# Patient Record
Sex: FEMALE | Race: WHITE | Employment: OTHER | URBAN - METROPOLITAN AREA
[De-identification: names, ages, dates, MRNs, and addresses within clinical notes are randomized per-mention and may not be internally consistent; named-entity substitution may affect disease eponyms.]

---

## 2023-04-21 ENCOUNTER — APPOINTMENT (OUTPATIENT)
Dept: ULTRASOUND IMAGING | Age: 63
End: 2023-04-21
Payer: COMMERCIAL

## 2023-04-21 ENCOUNTER — APPOINTMENT (OUTPATIENT)
Dept: MRI IMAGING | Age: 63
End: 2023-04-21
Payer: COMMERCIAL

## 2023-04-21 ENCOUNTER — APPOINTMENT (OUTPATIENT)
Dept: CT IMAGING | Age: 63
End: 2023-04-21
Payer: COMMERCIAL

## 2023-04-21 ENCOUNTER — HOSPITAL ENCOUNTER (OUTPATIENT)
Age: 63
Setting detail: OBSERVATION
Discharge: NURSING, REHAB OR LONG-TERM CARE | End: 2023-04-27
Attending: EMERGENCY MEDICINE | Admitting: FAMILY MEDICINE
Payer: COMMERCIAL

## 2023-04-21 ENCOUNTER — APPOINTMENT (OUTPATIENT)
Dept: NON INVASIVE DIAGNOSTICS | Age: 63
End: 2023-04-21
Payer: COMMERCIAL

## 2023-04-21 DIAGNOSIS — I63.9 CEREBROVASCULAR ACCIDENT (CVA), UNSPECIFIED MECHANISM (HCC): ICD-10-CM

## 2023-04-21 DIAGNOSIS — Z75.8 DOES NOT HAVE PRIMARY CARE PROVIDER: Primary | ICD-10-CM

## 2023-04-21 DIAGNOSIS — R42 DIZZINESS: ICD-10-CM

## 2023-04-21 PROBLEM — E66.812 CLASS 2 SEVERE OBESITY DUE TO EXCESS CALORIES WITH SERIOUS COMORBIDITY AND BODY MASS INDEX (BMI) OF 36.0 TO 36.9 IN ADULT: Status: ACTIVE | Noted: 2023-04-21

## 2023-04-21 PROBLEM — E04.1 THYROID NODULE: Status: ACTIVE | Noted: 2023-04-21

## 2023-04-21 PROBLEM — Z86.73 HISTORY OF CEREBROVASCULAR ACCIDENT: Status: ACTIVE | Noted: 2023-04-21

## 2023-04-21 PROBLEM — R29.90 STROKE-LIKE SYMPTOMS: Status: ACTIVE | Noted: 2023-04-21

## 2023-04-21 PROBLEM — R91.1 PULMONARY NODULE: Status: ACTIVE | Noted: 2023-04-21

## 2023-04-21 PROBLEM — E66.01 CLASS 2 SEVERE OBESITY DUE TO EXCESS CALORIES WITH SERIOUS COMORBIDITY AND BODY MASS INDEX (BMI) OF 36.0 TO 36.9 IN ADULT (HCC): Status: ACTIVE | Noted: 2023-04-21

## 2023-04-21 PROBLEM — F51.04 PSYCHOPHYSIOLOGIC INSOMNIA: Status: ACTIVE | Noted: 2023-04-21

## 2023-04-21 LAB
ANION GAP SERPL CALC-SCNC: 3 MMOL/L (ref 2–11)
BASOPHILS # BLD: 0 K/UL (ref 0–0.2)
BASOPHILS NFR BLD: 1 % (ref 0–2)
BUN SERPL-MCNC: 16 MG/DL (ref 8–23)
CALCIUM SERPL-MCNC: 8.9 MG/DL (ref 8.3–10.4)
CHLORIDE SERPL-SCNC: 104 MMOL/L (ref 101–110)
CHP ED QC CHECK: YES
CO2 SERPL-SCNC: 27 MMOL/L (ref 21–32)
CREAT SERPL-MCNC: 0.71 MG/DL (ref 0.6–1)
DIFFERENTIAL METHOD BLD: NORMAL
ECHO AO ASC DIAM: 3 CM
ECHO AO ASCENDING AORTA INDEX: 1.32 CM/M2
ECHO AO ROOT DIAM: 3.2 CM
ECHO AO ROOT INDEX: 1.41 CM/M2
ECHO AV AREA PEAK VELOCITY: 3 CM2
ECHO AV AREA VTI: 2.9 CM2
ECHO AV AREA/BSA PEAK VELOCITY: 1.3 CM2/M2
ECHO AV AREA/BSA VTI: 1.3 CM2/M2
ECHO AV MEAN GRADIENT: 7 MMHG
ECHO AV MEAN VELOCITY: 1.3 M/S
ECHO AV PEAK GRADIENT: 13 MMHG
ECHO AV PEAK VELOCITY: 1.8 M/S
ECHO AV VELOCITY RATIO: 0.89
ECHO AV VTI: 34.6 CM
ECHO BSA: 2.35 M2
ECHO EST RA PRESSURE: 3 MMHG
ECHO IVC EXP: 1.7 CM
ECHO LA AREA 2C: 18.1 CM2
ECHO LA AREA 4C: 24.2 CM2
ECHO LA DIAMETER INDEX: 1.59 CM/M2
ECHO LA DIAMETER: 3.6 CM
ECHO LA MAJOR AXIS: 6 CM
ECHO LA MINOR AXIS: 5.7 CM
ECHO LA TO AORTIC ROOT RATIO: 1.13
ECHO LA VOL 2C: 47 ML (ref 22–52)
ECHO LA VOL 4C: 79 ML (ref 22–52)
ECHO LA VOL BP: 62 ML (ref 22–52)
ECHO LA VOL/BSA BIPLANE: 27 ML/M2 (ref 16–34)
ECHO LA VOLUME INDEX A2C: 21 ML/M2 (ref 16–34)
ECHO LA VOLUME INDEX A4C: 35 ML/M2 (ref 16–34)
ECHO LV E' LATERAL VELOCITY: 12 CM/S
ECHO LV E' SEPTAL VELOCITY: 9 CM/S
ECHO LV EDV A2C: 95 ML
ECHO LV EDV A4C: 72 ML
ECHO LV EDV INDEX A4C: 32 ML/M2
ECHO LV EDV NDEX A2C: 42 ML/M2
ECHO LV EJECTION FRACTION A2C: 63 %
ECHO LV EJECTION FRACTION A4C: 57 %
ECHO LV EJECTION FRACTION BIPLANE: 62 % (ref 55–100)
ECHO LV ESV A2C: 35 ML
ECHO LV ESV A4C: 31 ML
ECHO LV ESV INDEX A2C: 15 ML/M2
ECHO LV ESV INDEX A4C: 14 ML/M2
ECHO LV FRACTIONAL SHORTENING: 34 % (ref 28–44)
ECHO LV INTERNAL DIMENSION DIASTOLE INDEX: 1.94 CM/M2
ECHO LV INTERNAL DIMENSION DIASTOLIC: 4.4 CM (ref 3.9–5.3)
ECHO LV INTERNAL DIMENSION SYSTOLIC INDEX: 1.28 CM/M2
ECHO LV INTERNAL DIMENSION SYSTOLIC: 2.9 CM
ECHO LV IVSD: 1.2 CM (ref 0.6–0.9)
ECHO LV MASS 2D: 147.8 G (ref 67–162)
ECHO LV MASS INDEX 2D: 65.1 G/M2 (ref 43–95)
ECHO LV POSTERIOR WALL DIASTOLIC: 0.8 CM (ref 0.6–0.9)
ECHO LV RELATIVE WALL THICKNESS RATIO: 0.36
ECHO LVOT AREA: 3.5 CM2
ECHO LVOT AV VTI INDEX: 0.85
ECHO LVOT DIAM: 2.1 CM
ECHO LVOT MEAN GRADIENT: 5 MMHG
ECHO LVOT PEAK GRADIENT: 10 MMHG
ECHO LVOT PEAK VELOCITY: 1.6 M/S
ECHO LVOT STROKE VOLUME INDEX: 44.7 ML/M2
ECHO LVOT SV: 101.4 ML
ECHO LVOT VTI: 29.3 CM
ECHO MV A VELOCITY: 1.06 M/S
ECHO MV E DECELERATION TIME (DT): 211 MS
ECHO MV E VELOCITY: 1.03 M/S
ECHO MV E/A RATIO: 0.97
ECHO MV E/E' LATERAL: 8.58
ECHO MV E/E' RATIO (AVERAGED): 10.01
ECHO MV E/E' SEPTAL: 11.44
ECHO PV ACCELERATION TIME (AT): 121 MS
ECHO PV MAX VELOCITY: 1.1 M/S
ECHO PV PEAK GRADIENT: 5 MMHG
ECHO RV BASAL DIMENSION: 3.7 CM
ECHO RV FREE WALL PEAK S': 12 CM/S
ECHO RV TAPSE: 1.9 CM (ref 1.7–?)
EKG ATRIAL RATE: 90 BPM
EKG DIAGNOSIS: NORMAL
EKG P AXIS: 0 DEGREES
EKG P-R INTERVAL: 122 MS
EKG Q-T INTERVAL: 361 MS
EKG QRS DURATION: 85 MS
EKG QTC CALCULATION (BAZETT): 440 MS
EKG R AXIS: 87 DEGREES
EKG T AXIS: 69 DEGREES
EKG VENTRICULAR RATE: 89 BPM
EOSINOPHIL # BLD: 0.1 K/UL (ref 0–0.8)
EOSINOPHIL NFR BLD: 2 % (ref 0.5–7.8)
ERYTHROCYTE [DISTWIDTH] IN BLOOD BY AUTOMATED COUNT: 12.7 % (ref 11.9–14.6)
GLUCOSE BLD STRIP.AUTO-MCNC: 96 MG/DL (ref 65–100)
GLUCOSE SERPL-MCNC: 95 MG/DL (ref 65–100)
HCT VFR BLD AUTO: 42.6 % (ref 35.8–46.3)
HGB BLD-MCNC: 13.4 G/DL (ref 11.7–15.4)
IMM GRANULOCYTES # BLD AUTO: 0 K/UL (ref 0–0.5)
IMM GRANULOCYTES NFR BLD AUTO: 0 % (ref 0–5)
INR BLD: 1 (ref 0.9–1.2)
LYMPHOCYTES # BLD: 1.1 K/UL (ref 0.5–4.6)
LYMPHOCYTES NFR BLD: 14 % (ref 13–44)
MCH RBC QN AUTO: 28.6 PG (ref 26.1–32.9)
MCHC RBC AUTO-ENTMCNC: 31.5 G/DL (ref 31.4–35)
MCV RBC AUTO: 90.8 FL (ref 82–102)
MONOCYTES # BLD: 0.5 K/UL (ref 0.1–1.3)
MONOCYTES NFR BLD: 7 % (ref 4–12)
NEUTS SEG # BLD: 5.6 K/UL (ref 1.7–8.2)
NEUTS SEG NFR BLD: 77 % (ref 43–78)
NRBC # BLD: 0 K/UL (ref 0–0.2)
PLATELET # BLD AUTO: 203 K/UL (ref 150–450)
PMV BLD AUTO: 11.8 FL (ref 9.4–12.3)
POTASSIUM SERPL-SCNC: 4 MMOL/L (ref 3.5–5.1)
PT BLD: 12.3 SECS (ref 9.6–11.6)
RBC # BLD AUTO: 4.69 M/UL (ref 4.05–5.2)
SERVICE CMNT-IMP: NORMAL
SODIUM SERPL-SCNC: 134 MMOL/L (ref 133–143)
TROPONIN I SERPL HS-MCNC: 4.4 PG/ML (ref 0–14)
WBC # BLD AUTO: 7.4 K/UL (ref 4.3–11.1)

## 2023-04-21 PROCEDURE — 84484 ASSAY OF TROPONIN QUANT: CPT

## 2023-04-21 PROCEDURE — 93306 TTE W/DOPPLER COMPLETE: CPT

## 2023-04-21 PROCEDURE — 71250 CT THORAX DX C-: CPT

## 2023-04-21 PROCEDURE — G0378 HOSPITAL OBSERVATION PER HR: HCPCS

## 2023-04-21 PROCEDURE — 6370000000 HC RX 637 (ALT 250 FOR IP): Performed by: FAMILY MEDICINE

## 2023-04-21 PROCEDURE — 99285 EMERGENCY DEPT VISIT HI MDM: CPT | Performed by: EMERGENCY MEDICINE

## 2023-04-21 PROCEDURE — 2500000003 HC RX 250 WO HCPCS: Performed by: FAMILY MEDICINE

## 2023-04-21 PROCEDURE — 82962 GLUCOSE BLOOD TEST: CPT

## 2023-04-21 PROCEDURE — 93306 TTE W/DOPPLER COMPLETE: CPT | Performed by: INTERNAL MEDICINE

## 2023-04-21 PROCEDURE — 93005 ELECTROCARDIOGRAM TRACING: CPT | Performed by: EMERGENCY MEDICINE

## 2023-04-21 PROCEDURE — 70498 CT ANGIOGRAPHY NECK: CPT

## 2023-04-21 PROCEDURE — 2580000003 HC RX 258: Performed by: EMERGENCY MEDICINE

## 2023-04-21 PROCEDURE — 6360000002 HC RX W HCPCS: Performed by: FAMILY MEDICINE

## 2023-04-21 PROCEDURE — 80048 BASIC METABOLIC PNL TOTAL CA: CPT

## 2023-04-21 PROCEDURE — 76536 US EXAM OF HEAD AND NECK: CPT

## 2023-04-21 PROCEDURE — 70450 CT HEAD/BRAIN W/O DYE: CPT

## 2023-04-21 PROCEDURE — 2580000003 HC RX 258: Performed by: FAMILY MEDICINE

## 2023-04-21 PROCEDURE — 96372 THER/PROPH/DIAG INJ SC/IM: CPT

## 2023-04-21 PROCEDURE — 85025 COMPLETE CBC W/AUTO DIFF WBC: CPT

## 2023-04-21 PROCEDURE — 93970 EXTREMITY STUDY: CPT

## 2023-04-21 PROCEDURE — 85610 PROTHROMBIN TIME: CPT

## 2023-04-21 PROCEDURE — 6360000004 HC RX CONTRAST MEDICATION: Performed by: EMERGENCY MEDICINE

## 2023-04-21 RX ORDER — ONDANSETRON 2 MG/ML
4 INJECTION INTRAMUSCULAR; INTRAVENOUS EVERY 6 HOURS PRN
Status: DISCONTINUED | OUTPATIENT
Start: 2023-04-21 | End: 2023-04-27 | Stop reason: HOSPADM

## 2023-04-21 RX ORDER — SODIUM CHLORIDE 0.9 % (FLUSH) 0.9 %
5-40 SYRINGE (ML) INJECTION 2 TIMES DAILY
Status: DISCONTINUED | OUTPATIENT
Start: 2023-04-21 | End: 2023-04-27 | Stop reason: HOSPADM

## 2023-04-21 RX ORDER — TRAZODONE HYDROCHLORIDE 150 MG/1
150 TABLET ORAL NIGHTLY
COMMUNITY

## 2023-04-21 RX ORDER — ROSUVASTATIN CALCIUM 20 MG/1
40 TABLET, COATED ORAL NIGHTLY
Status: DISCONTINUED | OUTPATIENT
Start: 2023-04-21 | End: 2023-04-27 | Stop reason: HOSPADM

## 2023-04-21 RX ORDER — 0.9 % SODIUM CHLORIDE 0.9 %
100 INTRAVENOUS SOLUTION INTRAVENOUS ONCE
Status: COMPLETED | OUTPATIENT
Start: 2023-04-21 | End: 2023-04-21

## 2023-04-21 RX ORDER — POTASSIUM CHLORIDE 7.45 MG/ML
10 INJECTION INTRAVENOUS PRN
Status: DISCONTINUED | OUTPATIENT
Start: 2023-04-21 | End: 2023-04-27 | Stop reason: HOSPADM

## 2023-04-21 RX ORDER — TRAZODONE HYDROCHLORIDE 50 MG/1
150 TABLET ORAL NIGHTLY
Status: DISCONTINUED | OUTPATIENT
Start: 2023-04-21 | End: 2023-04-27 | Stop reason: HOSPADM

## 2023-04-21 RX ORDER — ACETAMINOPHEN 325 MG/1
650 TABLET ORAL EVERY 4 HOURS PRN
Status: DISCONTINUED | OUTPATIENT
Start: 2023-04-21 | End: 2023-04-27 | Stop reason: HOSPADM

## 2023-04-21 RX ORDER — ASPIRIN 300 MG/1
300 SUPPOSITORY RECTAL DAILY
Status: DISCONTINUED | OUTPATIENT
Start: 2023-04-21 | End: 2023-04-22

## 2023-04-21 RX ORDER — LABETALOL HYDROCHLORIDE 5 MG/ML
10 INJECTION, SOLUTION INTRAVENOUS EVERY 10 MIN PRN
Status: DISCONTINUED | OUTPATIENT
Start: 2023-04-21 | End: 2023-04-27 | Stop reason: HOSPADM

## 2023-04-21 RX ORDER — POLYETHYLENE GLYCOL 3350 17 G/17G
17 POWDER, FOR SOLUTION ORAL DAILY PRN
Status: DISCONTINUED | OUTPATIENT
Start: 2023-04-21 | End: 2023-04-27 | Stop reason: HOSPADM

## 2023-04-21 RX ORDER — ONDANSETRON 4 MG/1
4 TABLET, ORALLY DISINTEGRATING ORAL EVERY 8 HOURS PRN
Status: DISCONTINUED | OUTPATIENT
Start: 2023-04-21 | End: 2023-04-27 | Stop reason: HOSPADM

## 2023-04-21 RX ORDER — ENOXAPARIN SODIUM 100 MG/ML
30 INJECTION SUBCUTANEOUS EVERY 12 HOURS
Status: DISCONTINUED | OUTPATIENT
Start: 2023-04-21 | End: 2023-04-27 | Stop reason: HOSPADM

## 2023-04-21 RX ORDER — ASPIRIN 81 MG/1
81 TABLET ORAL DAILY
Status: DISCONTINUED | OUTPATIENT
Start: 2023-04-21 | End: 2023-04-27 | Stop reason: HOSPADM

## 2023-04-21 RX ORDER — MAGNESIUM SULFATE IN WATER 40 MG/ML
2000 INJECTION, SOLUTION INTRAVENOUS PRN
Status: DISCONTINUED | OUTPATIENT
Start: 2023-04-21 | End: 2023-04-27 | Stop reason: HOSPADM

## 2023-04-21 RX ORDER — POTASSIUM CHLORIDE 20 MEQ/1
40 TABLET, EXTENDED RELEASE ORAL PRN
Status: DISCONTINUED | OUTPATIENT
Start: 2023-04-21 | End: 2023-04-27 | Stop reason: HOSPADM

## 2023-04-21 RX ADMIN — ACETAMINOPHEN 650 MG: 325 TABLET, FILM COATED ORAL at 20:21

## 2023-04-21 RX ADMIN — TUBERCULIN PURIFIED PROTEIN DERIVATIVE 5 UNITS: 5 INJECTION, SOLUTION INTRADERMAL at 17:00

## 2023-04-21 RX ADMIN — SODIUM CHLORIDE 100 ML: 9 INJECTION, SOLUTION INTRAVENOUS at 10:50

## 2023-04-21 RX ADMIN — SODIUM CHLORIDE, PRESERVATIVE FREE 10 ML: 5 INJECTION INTRAVENOUS at 10:50

## 2023-04-21 RX ADMIN — SODIUM CHLORIDE, PRESERVATIVE FREE 10 ML: 5 INJECTION INTRAVENOUS at 20:11

## 2023-04-21 RX ADMIN — ROSUVASTATIN 40 MG: 20 TABLET, FILM COATED ORAL at 20:10

## 2023-04-21 RX ADMIN — TRAZODONE HYDROCHLORIDE 150 MG: 50 TABLET ORAL at 20:10

## 2023-04-21 RX ADMIN — ENOXAPARIN SODIUM 30 MG: 100 INJECTION SUBCUTANEOUS at 16:54

## 2023-04-21 RX ADMIN — IOPAMIDOL 60 ML: 755 INJECTION, SOLUTION INTRAVENOUS at 10:50

## 2023-04-21 ASSESSMENT — ENCOUNTER SYMPTOMS
NAUSEA: 0
SHORTNESS OF BREATH: 0
WHEEZING: 0
EYE PAIN: 0
SINUS PAIN: 0
ABDOMINAL PAIN: 0
BACK PAIN: 0
VOMITING: 0
TROUBLE SWALLOWING: 0
EYE REDNESS: 0
BLOOD IN STOOL: 0
CONSTIPATION: 0
DIARRHEA: 0
COUGH: 0
EYE ITCHING: 0

## 2023-04-21 ASSESSMENT — PAIN DESCRIPTION - LOCATION
LOCATION: HEAD
LOCATION: HEAD

## 2023-04-21 ASSESSMENT — PAIN SCALES - GENERAL
PAINLEVEL_OUTOF10: 6
PAINLEVEL_OUTOF10: 6

## 2023-04-21 ASSESSMENT — PAIN - FUNCTIONAL ASSESSMENT: PAIN_FUNCTIONAL_ASSESSMENT: NONE - DENIES PAIN

## 2023-04-22 ENCOUNTER — APPOINTMENT (OUTPATIENT)
Dept: MRI IMAGING | Age: 63
End: 2023-04-22
Payer: COMMERCIAL

## 2023-04-22 PROBLEM — R53.81 DEBILITY: Status: ACTIVE | Noted: 2021-06-13

## 2023-04-22 PROBLEM — F17.210 CIGARETTE NICOTINE DEPENDENCE WITHOUT COMPLICATION: Status: RESOLVED | Noted: 2022-03-04 | Resolved: 2023-04-22

## 2023-04-22 PROBLEM — F90.8 ADULT RESIDUAL TYPE ATTENTION DEFICIT HYPERACTIVITY DISORDER (ADHD): Status: ACTIVE | Noted: 2020-11-13

## 2023-04-22 PROBLEM — F51.01 PRIMARY INSOMNIA: Status: ACTIVE | Noted: 2020-09-15

## 2023-04-22 PROBLEM — R41.0 CONFUSION AND DISORIENTATION: Status: RESOLVED | Noted: 2017-06-01 | Resolved: 2023-04-22

## 2023-04-22 PROBLEM — G62.9 NEUROPATHY: Status: ACTIVE | Noted: 2020-09-15

## 2023-04-22 PROBLEM — R41.0 CONFUSION AND DISORIENTATION: Status: ACTIVE | Noted: 2017-06-01

## 2023-04-22 PROBLEM — E78.00 ELEVATED LDL CHOLESTEROL LEVEL: Status: ACTIVE | Noted: 2022-03-04

## 2023-04-22 PROBLEM — R91.8 PULMONARY NODULES: Status: ACTIVE | Noted: 2023-04-21

## 2023-04-22 PROBLEM — F17.200 TOBACCO USE DISORDER: Status: ACTIVE | Noted: 2020-09-15

## 2023-04-22 PROBLEM — H54.42A4 BLINDNESS LEFT EYE CATEGORY 4, NORMAL VISION RIGHT EYE: Status: ACTIVE | Noted: 2022-03-04

## 2023-04-22 PROBLEM — I34.0 MITRAL REGURGITATION: Status: ACTIVE | Noted: 2023-04-22

## 2023-04-22 PROBLEM — J18.9 RIGHT LOWER LOBE PNEUMONIA: Status: ACTIVE | Noted: 2021-11-28

## 2023-04-22 PROBLEM — F17.210 CIGARETTE NICOTINE DEPENDENCE WITHOUT COMPLICATION: Status: ACTIVE | Noted: 2022-03-04

## 2023-04-22 PROBLEM — F31.11 BIPOLAR 1 DISORDER, MANIC, MILD (HCC): Status: ACTIVE | Noted: 2021-06-15

## 2023-04-22 LAB
ALBUMIN SERPL-MCNC: 3.3 G/DL (ref 3.2–4.6)
ANION GAP SERPL CALC-SCNC: 3 MMOL/L (ref 2–11)
BUN SERPL-MCNC: 11 MG/DL (ref 8–23)
CALCIUM SERPL-MCNC: 8.8 MG/DL (ref 8.3–10.4)
CHLORIDE SERPL-SCNC: 107 MMOL/L (ref 101–110)
CHOLEST SERPL-MCNC: 244 MG/DL
CO2 SERPL-SCNC: 25 MMOL/L (ref 21–32)
CREAT SERPL-MCNC: 0.83 MG/DL (ref 0.6–1)
ERYTHROCYTE [DISTWIDTH] IN BLOOD BY AUTOMATED COUNT: 12.9 % (ref 11.9–14.6)
EST. AVERAGE GLUCOSE BLD GHB EST-MCNC: 111 MG/DL
GLUCOSE SERPL-MCNC: 93 MG/DL (ref 65–100)
HBA1C MFR BLD: 5.5 % (ref 4.8–5.6)
HCT VFR BLD AUTO: 41.3 % (ref 35.8–46.3)
HDLC SERPL-MCNC: 64 MG/DL (ref 40–60)
HDLC SERPL: 3.8
HGB BLD-MCNC: 12.9 G/DL (ref 11.7–15.4)
LDLC SERPL CALC-MCNC: 158.6 MG/DL
MCH RBC QN AUTO: 28.4 PG (ref 26.1–32.9)
MCHC RBC AUTO-ENTMCNC: 31.2 G/DL (ref 31.4–35)
MCV RBC AUTO: 91 FL (ref 82–102)
MM INDURATION, POC: 0 MM (ref 0–5)
NRBC # BLD: 0 K/UL (ref 0–0.2)
PHOSPHATE SERPL-MCNC: 3.6 MG/DL (ref 2.3–3.7)
PLATELET # BLD AUTO: 196 K/UL (ref 150–450)
PMV BLD AUTO: 12 FL (ref 9.4–12.3)
POTASSIUM SERPL-SCNC: 3.9 MMOL/L (ref 3.5–5.1)
PPD, POC: NEGATIVE
PROCALCITONIN SERPL-MCNC: <0.05 NG/ML (ref 0–0.49)
RBC # BLD AUTO: 4.54 M/UL (ref 4.05–5.2)
SODIUM SERPL-SCNC: 135 MMOL/L (ref 133–143)
TRIGL SERPL-MCNC: 107 MG/DL (ref 35–150)
TSH W FREE THYROID IF ABNORMAL: 0.62 UIU/ML (ref 0.36–3.74)
VLDLC SERPL CALC-MCNC: 21.4 MG/DL (ref 6–23)
WBC # BLD AUTO: 5.4 K/UL (ref 4.3–11.1)

## 2023-04-22 PROCEDURE — 96374 THER/PROPH/DIAG INJ IV PUSH: CPT

## 2023-04-22 PROCEDURE — 97530 THERAPEUTIC ACTIVITIES: CPT

## 2023-04-22 PROCEDURE — 97161 PT EVAL LOW COMPLEX 20 MIN: CPT

## 2023-04-22 PROCEDURE — G0378 HOSPITAL OBSERVATION PER HR: HCPCS

## 2023-04-22 PROCEDURE — 84100 ASSAY OF PHOSPHORUS: CPT

## 2023-04-22 PROCEDURE — 6360000002 HC RX W HCPCS: Performed by: FAMILY MEDICINE

## 2023-04-22 PROCEDURE — 80048 BASIC METABOLIC PNL TOTAL CA: CPT

## 2023-04-22 PROCEDURE — 80061 LIPID PANEL: CPT

## 2023-04-22 PROCEDURE — 85027 COMPLETE CBC AUTOMATED: CPT

## 2023-04-22 PROCEDURE — 6370000000 HC RX 637 (ALT 250 FOR IP): Performed by: FAMILY MEDICINE

## 2023-04-22 PROCEDURE — 97165 OT EVAL LOW COMPLEX 30 MIN: CPT

## 2023-04-22 PROCEDURE — 2580000003 HC RX 258: Performed by: FAMILY MEDICINE

## 2023-04-22 PROCEDURE — 82040 ASSAY OF SERUM ALBUMIN: CPT

## 2023-04-22 PROCEDURE — 96375 TX/PRO/DX INJ NEW DRUG ADDON: CPT

## 2023-04-22 PROCEDURE — 96372 THER/PROPH/DIAG INJ SC/IM: CPT

## 2023-04-22 PROCEDURE — 36415 COLL VENOUS BLD VENIPUNCTURE: CPT

## 2023-04-22 PROCEDURE — 97535 SELF CARE MNGMENT TRAINING: CPT

## 2023-04-22 PROCEDURE — 83036 HEMOGLOBIN GLYCOSYLATED A1C: CPT

## 2023-04-22 PROCEDURE — 84443 ASSAY THYROID STIM HORMONE: CPT

## 2023-04-22 PROCEDURE — 84145 PROCALCITONIN (PCT): CPT

## 2023-04-22 PROCEDURE — 70551 MRI BRAIN STEM W/O DYE: CPT

## 2023-04-22 RX ORDER — PROCHLORPERAZINE EDISYLATE 5 MG/ML
10 INJECTION INTRAMUSCULAR; INTRAVENOUS ONCE
Status: COMPLETED | OUTPATIENT
Start: 2023-04-22 | End: 2023-04-22

## 2023-04-22 RX ADMIN — ACETAMINOPHEN 650 MG: 325 TABLET, FILM COATED ORAL at 08:43

## 2023-04-22 RX ADMIN — ONDANSETRON 4 MG: 4 TABLET, ORALLY DISINTEGRATING ORAL at 17:56

## 2023-04-22 RX ADMIN — TRAZODONE HYDROCHLORIDE 150 MG: 50 TABLET ORAL at 20:56

## 2023-04-22 RX ADMIN — ENOXAPARIN SODIUM 30 MG: 100 INJECTION SUBCUTANEOUS at 08:35

## 2023-04-22 RX ADMIN — ENOXAPARIN SODIUM 30 MG: 100 INJECTION SUBCUTANEOUS at 20:56

## 2023-04-22 RX ADMIN — SODIUM CHLORIDE, PRESERVATIVE FREE 10 ML: 5 INJECTION INTRAVENOUS at 20:57

## 2023-04-22 RX ADMIN — ASPIRIN 81 MG: 81 TABLET, COATED ORAL at 08:35

## 2023-04-22 RX ADMIN — PROCHLORPERAZINE EDISYLATE 10 MG: 5 INJECTION INTRAMUSCULAR; INTRAVENOUS at 22:14

## 2023-04-22 RX ADMIN — ONDANSETRON 4 MG: 2 INJECTION INTRAMUSCULAR; INTRAVENOUS at 08:32

## 2023-04-22 RX ADMIN — SODIUM CHLORIDE, PRESERVATIVE FREE 20 ML: 5 INJECTION INTRAVENOUS at 08:33

## 2023-04-22 RX ADMIN — ROSUVASTATIN 40 MG: 20 TABLET, FILM COATED ORAL at 20:56

## 2023-04-22 ASSESSMENT — PAIN SCALES - GENERAL
PAINLEVEL_OUTOF10: 0
PAINLEVEL_OUTOF10: 0
PAINLEVEL_OUTOF10: 6

## 2023-04-22 ASSESSMENT — PAIN DESCRIPTION - LOCATION: LOCATION: HEAD

## 2023-04-22 ASSESSMENT — PAIN DESCRIPTION - DESCRIPTORS: DESCRIPTORS: ACHING

## 2023-04-23 LAB
ALBUMIN SERPL-MCNC: 3.2 G/DL (ref 3.2–4.6)
ANION GAP SERPL CALC-SCNC: 3 MMOL/L (ref 2–11)
BUN SERPL-MCNC: 14 MG/DL (ref 8–23)
CALCIUM SERPL-MCNC: 9 MG/DL (ref 8.3–10.4)
CHLORIDE SERPL-SCNC: 110 MMOL/L (ref 101–110)
CO2 SERPL-SCNC: 25 MMOL/L (ref 21–32)
CREAT SERPL-MCNC: 0.83 MG/DL (ref 0.6–1)
ERYTHROCYTE [DISTWIDTH] IN BLOOD BY AUTOMATED COUNT: 13 % (ref 11.9–14.6)
GLUCOSE SERPL-MCNC: 99 MG/DL (ref 65–100)
HCT VFR BLD AUTO: 42.7 % (ref 35.8–46.3)
HGB BLD-MCNC: 13.2 G/DL (ref 11.7–15.4)
MCH RBC QN AUTO: 28.3 PG (ref 26.1–32.9)
MCHC RBC AUTO-ENTMCNC: 30.9 G/DL (ref 31.4–35)
MCV RBC AUTO: 91.6 FL (ref 82–102)
MM INDURATION, POC: 0 MM (ref 0–5)
NRBC # BLD: 0 K/UL (ref 0–0.2)
PHOSPHATE SERPL-MCNC: 4.1 MG/DL (ref 2.3–3.7)
PLATELET # BLD AUTO: 184 K/UL (ref 150–450)
PMV BLD AUTO: 12 FL (ref 9.4–12.3)
POTASSIUM SERPL-SCNC: 3.9 MMOL/L (ref 3.5–5.1)
PPD, POC: NEGATIVE
PROCALCITONIN SERPL-MCNC: <0.05 NG/ML (ref 0–0.49)
RBC # BLD AUTO: 4.66 M/UL (ref 4.05–5.2)
SODIUM SERPL-SCNC: 138 MMOL/L (ref 133–143)
WBC # BLD AUTO: 5.2 K/UL (ref 4.3–11.1)

## 2023-04-23 PROCEDURE — 6370000000 HC RX 637 (ALT 250 FOR IP): Performed by: FAMILY MEDICINE

## 2023-04-23 PROCEDURE — 36415 COLL VENOUS BLD VENIPUNCTURE: CPT

## 2023-04-23 PROCEDURE — 82040 ASSAY OF SERUM ALBUMIN: CPT

## 2023-04-23 PROCEDURE — 84100 ASSAY OF PHOSPHORUS: CPT

## 2023-04-23 PROCEDURE — 80048 BASIC METABOLIC PNL TOTAL CA: CPT

## 2023-04-23 PROCEDURE — 84145 PROCALCITONIN (PCT): CPT

## 2023-04-23 PROCEDURE — 2580000003 HC RX 258: Performed by: FAMILY MEDICINE

## 2023-04-23 PROCEDURE — 6360000002 HC RX W HCPCS: Performed by: FAMILY MEDICINE

## 2023-04-23 PROCEDURE — G0378 HOSPITAL OBSERVATION PER HR: HCPCS

## 2023-04-23 PROCEDURE — 96372 THER/PROPH/DIAG INJ SC/IM: CPT

## 2023-04-23 PROCEDURE — 85027 COMPLETE CBC AUTOMATED: CPT

## 2023-04-23 PROCEDURE — 96376 TX/PRO/DX INJ SAME DRUG ADON: CPT

## 2023-04-23 RX ADMIN — ENOXAPARIN SODIUM 30 MG: 100 INJECTION SUBCUTANEOUS at 08:39

## 2023-04-23 RX ADMIN — TRAZODONE HYDROCHLORIDE 150 MG: 50 TABLET ORAL at 21:21

## 2023-04-23 RX ADMIN — SODIUM CHLORIDE, PRESERVATIVE FREE 10 ML: 5 INJECTION INTRAVENOUS at 21:23

## 2023-04-23 RX ADMIN — ENOXAPARIN SODIUM 30 MG: 100 INJECTION SUBCUTANEOUS at 21:22

## 2023-04-23 RX ADMIN — SODIUM CHLORIDE, PRESERVATIVE FREE 10 ML: 5 INJECTION INTRAVENOUS at 08:40

## 2023-04-23 RX ADMIN — ASPIRIN 81 MG: 81 TABLET, COATED ORAL at 08:40

## 2023-04-23 RX ADMIN — ONDANSETRON 4 MG: 2 INJECTION INTRAMUSCULAR; INTRAVENOUS at 17:35

## 2023-04-23 RX ADMIN — ROSUVASTATIN 40 MG: 20 TABLET, FILM COATED ORAL at 21:21

## 2023-04-24 LAB
ALBUMIN SERPL-MCNC: 3.2 G/DL (ref 3.2–4.6)
ANION GAP SERPL CALC-SCNC: 3 MMOL/L (ref 2–11)
BUN SERPL-MCNC: 15 MG/DL (ref 8–23)
CALCIUM SERPL-MCNC: 8.8 MG/DL (ref 8.3–10.4)
CHLORIDE SERPL-SCNC: 110 MMOL/L (ref 101–110)
CO2 SERPL-SCNC: 26 MMOL/L (ref 21–32)
CREAT SERPL-MCNC: 0.75 MG/DL (ref 0.6–1)
ERYTHROCYTE [DISTWIDTH] IN BLOOD BY AUTOMATED COUNT: 12.9 % (ref 11.9–14.6)
GLUCOSE SERPL-MCNC: 97 MG/DL (ref 65–100)
HCT VFR BLD AUTO: 42.3 % (ref 35.8–46.3)
HGB BLD-MCNC: 13.1 G/DL (ref 11.7–15.4)
MCH RBC QN AUTO: 28.3 PG (ref 26.1–32.9)
MCHC RBC AUTO-ENTMCNC: 31 G/DL (ref 31.4–35)
MCV RBC AUTO: 91.4 FL (ref 82–102)
NRBC # BLD: 0 K/UL (ref 0–0.2)
PHOSPHATE SERPL-MCNC: 4 MG/DL (ref 2.3–3.7)
PLATELET # BLD AUTO: 189 K/UL (ref 150–450)
PMV BLD AUTO: 12.1 FL (ref 9.4–12.3)
POTASSIUM SERPL-SCNC: 4.1 MMOL/L (ref 3.5–5.1)
PROCALCITONIN SERPL-MCNC: <0.05 NG/ML (ref 0–0.49)
RBC # BLD AUTO: 4.63 M/UL (ref 4.05–5.2)
SODIUM SERPL-SCNC: 139 MMOL/L (ref 133–143)
WBC # BLD AUTO: 5.9 K/UL (ref 4.3–11.1)

## 2023-04-24 PROCEDURE — 97116 GAIT TRAINING THERAPY: CPT

## 2023-04-24 PROCEDURE — 85027 COMPLETE CBC AUTOMATED: CPT

## 2023-04-24 PROCEDURE — 80048 BASIC METABOLIC PNL TOTAL CA: CPT

## 2023-04-24 PROCEDURE — 82040 ASSAY OF SERUM ALBUMIN: CPT

## 2023-04-24 PROCEDURE — G0378 HOSPITAL OBSERVATION PER HR: HCPCS

## 2023-04-24 PROCEDURE — 97530 THERAPEUTIC ACTIVITIES: CPT

## 2023-04-24 PROCEDURE — 6370000000 HC RX 637 (ALT 250 FOR IP): Performed by: FAMILY MEDICINE

## 2023-04-24 PROCEDURE — 96372 THER/PROPH/DIAG INJ SC/IM: CPT

## 2023-04-24 PROCEDURE — 2580000003 HC RX 258: Performed by: FAMILY MEDICINE

## 2023-04-24 PROCEDURE — 84100 ASSAY OF PHOSPHORUS: CPT

## 2023-04-24 PROCEDURE — 96376 TX/PRO/DX INJ SAME DRUG ADON: CPT

## 2023-04-24 PROCEDURE — 6360000002 HC RX W HCPCS: Performed by: FAMILY MEDICINE

## 2023-04-24 PROCEDURE — 97110 THERAPEUTIC EXERCISES: CPT

## 2023-04-24 PROCEDURE — 36415 COLL VENOUS BLD VENIPUNCTURE: CPT

## 2023-04-24 PROCEDURE — 84145 PROCALCITONIN (PCT): CPT

## 2023-04-24 RX ADMIN — ROSUVASTATIN 40 MG: 20 TABLET, FILM COATED ORAL at 21:45

## 2023-04-24 RX ADMIN — SODIUM CHLORIDE, PRESERVATIVE FREE 10 ML: 5 INJECTION INTRAVENOUS at 09:01

## 2023-04-24 RX ADMIN — ONDANSETRON 4 MG: 4 TABLET, ORALLY DISINTEGRATING ORAL at 12:30

## 2023-04-24 RX ADMIN — ASPIRIN 81 MG: 81 TABLET, COATED ORAL at 08:59

## 2023-04-24 RX ADMIN — ONDANSETRON 4 MG: 2 INJECTION INTRAMUSCULAR; INTRAVENOUS at 05:41

## 2023-04-24 RX ADMIN — ENOXAPARIN SODIUM 30 MG: 100 INJECTION SUBCUTANEOUS at 08:59

## 2023-04-24 RX ADMIN — TRAZODONE HYDROCHLORIDE 150 MG: 50 TABLET ORAL at 21:45

## 2023-04-24 RX ADMIN — ENOXAPARIN SODIUM 30 MG: 100 INJECTION SUBCUTANEOUS at 21:44

## 2023-04-24 ASSESSMENT — PAIN SCALES - GENERAL: PAINLEVEL_OUTOF10: 0

## 2023-04-24 NOTE — PROGRESS NOTES
ACUTE PHYSICAL THERAPY GOALS:   (Developed with and agreed upon by patient and/or caregiver. )    LTG:   (1.)Ms. Kelsey Davenport will transfer from bed to chair and chair to bed with INDEPENDENT using the least restrictive device within 5 treatment day(s). (2.)Ms. Kelsey Davenport will ambulate with SUPERVISION for 200 feet with the least restrictive device within 5 treatment day(s). 3.  Ms. Kelsey Davenport will ambulate up/down 22 steps with one rail and supervision within 5 treatment days. ________________________________________________________________________________________________      PHYSICAL THERAPY Daily Note and AM  (Link to Caseload Tracking: PT Visit Days : 2  Acknowledge Orders  Time In/Out  PT Charge Capture  Rehab Caseload Tracker    Caren Sinclair is a 58 y.o. female   PRIMARY DIAGNOSIS: Stroke-like symptoms  Dizziness [R42]  Stroke-like symptoms [R29.90]  Cerebrovascular accident (CVA), unspecified [facility] ([facility] [I63.9]  Does not have primary care provider [Z75.8]       Reason for Referral: Other lack of cordination (R27.8)  Difficulty in walking, Not elsewhere classified (R26.2)  Dizziness and Giddiness (R42)  Observation: Payor: [employer] MEDICARE / Plan: [place] [employer] / Product Type: *No Product type* /     ASSESSMENT:     REHAB RECOMMENDATIONS:   Recommendation to date pending progress:  Setting:  Short-term Rehab    Equipment:    To Be Determined     ASSESSMENT:  Ms. Kelsey Davenport admitted with above diagnoses. Patient has a history of CVA affecting her vision but she is independent at base without a device. CT showed \"blurring of the gray-white differentiation centered about he anterior right frontal lobe with mild mass effect\". MRI, however, showed no acute abnormality. Patient currently demonstrates decreased balance, mobility, and independence due to ongoing dizziness. Patient would benefit from therapy to address these deficits.     4/24:  Pt supine upon arrival.  Pt still with dizziness and nausea limiting mobility,

## 2023-04-24 NOTE — PROGRESS NOTES
Hospitalist Progress Note   Admit Date:  2023 10:13 AM   Name:  Kellie Son   Age:  58 y.o. Sex:  female  :  1960   MRN:  [id number]   Room:  365/01    Presenting/Chief Complaint: Dizziness     Reason(s) for Admission: Dizziness [R42]  Stroke-like symptoms [R29.90]  Cerebrovascular accident (CVA), unspecified mechanism ([facility] [I63.9]  Does not have primary care provider [facility] Course:   58 y.o. female with medical history of BMI 36, prior stroke who presented with dizziness and weakness. She was at substance use rehab and woke up dizzy. She noted left sided weakness and difficulty walking. 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 She does not take any medications except trazodone. She went to the ED and a code stroke was called. CT concerning for subacute stroke. CT HN with no cerebrovascular findings. She was admitted to observation for further evaluation and management. Subjective & 24hr Events (23): Remains dizzy, but improved. Will continue PT and consider DC options in AM. Plan discussed with nurse, therapist and . Will attempt to get PT vestibular specialist to evaluate today. Assessment & Plan:   Stroke-like symptoms, dizziness  Admission CT concerning for L stroke. CTA HN negative for CV disease. MRI negative  -consult neurology  -ASA  -PT, OT, SLP, PPD  2023: stroke ruled out, appears severe vertigo, contacted PT vestibular specialist, will attempt eval and treat     Lung nodule  -CT chest in 1 year     Thyroid nodule  US thyroid confirmed multiple nodule, TSH WNL  -Outpatient follow-up for biopsy of node listed on note from radiology     Psychophysiologic insomnia  -trazodone     BMI 36  Increased risk of all cause mortality, complicating care  - healthy lifestyle at discharge    Mitral regurgitation  Noted on echocardiogram      PT/OT evals and PPD needed/ordered? Yes  Diet:  ADULT DIET;  Regular; 4 carb choices (60 gm/meal)  VTE

## 2023-04-24 NOTE — PROGRESS NOTES
Patient very anxious regarding discharge plan, states CM was very rude, and totally unhelpful. Pt requesting Hospitalist and Case management see her in the morning to discuss discharge needs.

## 2023-04-24 NOTE — PROGRESS NOTES
ACUTE OCCUPATIONAL THERAPY GOALS:   (Developed with and agreed upon by patient and/or caregiver.)  1. Patient will perform grooming with supervision. 2. Patient will perform upper body dressing with supervision. 3. Patient will perform lower body dressing with CGA  4. Patient will perform bathing with CGa  5. Patient will perform toileting and toilet transfer with contact guard assist.  Angelica Tadeo 4/24/23    6 Patient/family to demonstrate knowledge of home safety and DME recommendations. Goals to be achieved in 7 days. OCCUPATIONAL THERAPY: Daily Note AM   OT Visit Days: 2   Time In/Out  OT Charge Capture  Rehab Caseload Tracker  OT Orders    Jhoana Ledesma is a 58 y.o. female   PRIMARY DIAGNOSIS: Stroke-like symptoms  Dizziness [R42]  Stroke-like symptoms [R29.90]  Cerebrovascular accident (CVA), unspecified mechanism ([facility] [I63.9]  Does not have primary care provider [Z75.8]       Observation: Payor: Heather Mariscal / Plan: [employer] / Product Type: *No Product type* /     ASSESSMENT:     REHAB RECOMMENDATIONS: CURRENT LEVEL OF FUNCTION:  (Most Recently Demonstrated)   Recommendation to date pending progress:  Setting:  Inpatient Rehab Facility    Equipment:    To Be Determined Bathing:  Not Tested  Dressing:  Not Tested  Feeding/Grooming:  Not Tested  Toileting:  Not Tested  Functional Mobility:  Stand by Assist-contact guard assist      ASSESSMENT:  From initial evaluation on 4/22/23: Ms. Angela Ashford supine in bed. She reported she had a stroke before and she lost vision in her L eye and then the lower portion of her right eye.she lives at [facility] for residential addictions recovery and has been there for a year. She is on the second floor 22 steps but there is an elevator if you get permission to use it. She has a roommate and the bathroom is next to her room with a walk in shower with grab bars and seat. She was independent prior to this episode. She had a similar episode last year but symptoms resolved.  BP supine

## 2023-04-25 PROCEDURE — 96372 THER/PROPH/DIAG INJ SC/IM: CPT

## 2023-04-25 PROCEDURE — 6360000002 HC RX W HCPCS: Performed by: FAMILY MEDICINE

## 2023-04-25 PROCEDURE — G0378 HOSPITAL OBSERVATION PER HR: HCPCS

## 2023-04-25 PROCEDURE — 96376 TX/PRO/DX INJ SAME DRUG ADON: CPT

## 2023-04-25 PROCEDURE — 6370000000 HC RX 637 (ALT 250 FOR IP): Performed by: FAMILY MEDICINE

## 2023-04-25 PROCEDURE — 2580000003 HC RX 258: Performed by: FAMILY MEDICINE

## 2023-04-25 PROCEDURE — 97110 THERAPEUTIC EXERCISES: CPT

## 2023-04-25 PROCEDURE — 97530 THERAPEUTIC ACTIVITIES: CPT

## 2023-04-25 RX ADMIN — ONDANSETRON 4 MG: 4 TABLET, ORALLY DISINTEGRATING ORAL at 16:27

## 2023-04-25 RX ADMIN — ASPIRIN 81 MG: 81 TABLET, COATED ORAL at 09:09

## 2023-04-25 RX ADMIN — TRAZODONE HYDROCHLORIDE 150 MG: 50 TABLET ORAL at 21:38

## 2023-04-25 RX ADMIN — ENOXAPARIN SODIUM 30 MG: 100 INJECTION SUBCUTANEOUS at 21:38

## 2023-04-25 RX ADMIN — ROSUVASTATIN 40 MG: 20 TABLET, FILM COATED ORAL at 21:38

## 2023-04-25 RX ADMIN — ENOXAPARIN SODIUM 30 MG: 100 INJECTION SUBCUTANEOUS at 09:09

## 2023-04-25 RX ADMIN — ONDANSETRON 4 MG: 2 INJECTION INTRAMUSCULAR; INTRAVENOUS at 22:10

## 2023-04-25 RX ADMIN — SODIUM CHLORIDE, PRESERVATIVE FREE 10 ML: 5 INJECTION INTRAVENOUS at 22:10

## 2023-04-25 NOTE — PROGRESS NOTES
PT was in bed awake. [address] [place] [address] introduced self. PT expressed eagerness to speak to [address] [place] [address]. PT shared with [address] [place] [address] about PT's illness, hospitalization, life, and recovery. CH offered empathetic spiritual presence, active listening, and therapeutic communication. 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 PT expressed great gratitude to \"[employer]\", [place], and the [facility]. PT expressed appreciation for Recovery Program at [facility] especially the example of the Staff and the Bible Study. PT stated PT will graduate in May. PT expressed desire to serve God and others PT asked about various types of CH. RN and PT's Friend arrived. CH offered prayer. [address] [place] [address] thanked PT for visit and offered support. Rev. Qamar Bruno M.Div.

## 2023-04-25 NOTE — PROGRESS NOTES
ACUTE PHYSICAL THERAPY GOALS:   (Developed with and agreed upon by patient and/or caregiver. )    LTG:   (1.)Ms. Marilia Oates will transfer from bed to chair and chair to bed with INDEPENDENT using the least restrictive device within 5 treatment day(s). (2.)Ms. Marilia Oates will ambulate with SUPERVISION for 200 feet with the least restrictive device within 5 treatment day(s). 3.  Ms. Marilia Oates will ambulate up/down 22 steps with one rail and supervision within 5 treatment days. ________________________________________________________________________________________________      PHYSICAL THERAPY Daily Note and AM  (Link to Caseload Tracking: PT Visit Days : 3  Acknowledge Orders  Time In/Out  PT Charge Capture  Rehab Caseload Tracker    Rita Rush is a 58 y.o. female   PRIMARY DIAGNOSIS: Stroke-like symptoms  Dizziness [R42]  Stroke-like symptoms [R29.90]  Cerebrovascular accident (CVA), unspecified mechanism [facility] [I63.9]  Does not have primary care provider [Z75.8]       Reason for Referral: Other lack of cordination (R27.8)  Difficulty in walking, Not elsewhere classified (R26.2)  Dizziness and Giddiness (R42)  Observation: Payor: [employer] MEDICARE / Plan: [place] [employer] / Product Type: *No Product type* /     ASSESSMENT:     REHAB RECOMMENDATIONS:   Recommendation to date pending progress:  Setting:  Short-term Rehab    Equipment:    To Be Determined     ASSESSMENT:  Ms. Marilia Oates admitted with above diagnoses. Patient has a history of CVA affecting her vision but she is independent at base without a device. CT showed \"blurring of the gray-white differentiation centered about he anterior right frontal lobe with mild mass effect\". MRI, however, showed no acute abnormality. Patient currently demonstrates decreased balance, mobility, and independence due to ongoing dizziness. Patient would benefit from therapy to address these deficits.     4/24:  Pt supine upon arrival.  Pt still with dizziness and nausea limiting mobility,

## 2023-04-25 NOTE — PROGRESS NOTES
Hospitalist Progress Note   Admit Date:  2023 10:13 AM   Name:  Jennifer Cast   Age:  58 y.o. Sex:  female  :  1960   MRN:  [id number]   Room:  365/01    Presenting/Chief Complaint: Dizziness     Reason(s) for Admission: Dizziness [R42]  Stroke-like symptoms [R29.90]  Cerebrovascular accident (CVA), unspecified mechanism ([facility] [I63.9]  Does not have primary care provider [facility] Course:   58 y.o. female with medical history of BMI 36, prior stroke who presented with dizziness and weakness. She was at substance use rehab and woke up dizzy. She noted left sided weakness and difficulty walking. 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 She does not take any medications except trazodone. She went to the ED and a code stroke was called. CT concerning for subacute stroke. CT HN with no cerebrovascular findings. She was admitted to observation for further evaluation and management. Subjective & 24hr Events (23): Remains dizzy, but improved. Will continue PT and consider DC options in AM. Plan discussed with nurse and . Assessment & Plan:   Stroke-like symptoms, dizziness  Admission CT concerning for L stroke. CTA HN negative for CV disease. MRI negative  -consult neurology  -ASA  -PT, OT, SLP, PPD  2023: stroke ruled out, appears severe vertigo, improving with PT, not at baseline     Lung nodule  -CT chest in 1 year     Thyroid nodule  US thyroid confirmed multiple nodule, TSH WNL  -Outpatient follow-up for biopsy of node listed on note from radiology     Psychophysiologic insomnia  -trazodone     BMI 36  Increased risk of all cause mortality, complicating care  - healthy lifestyle at discharge    Mitral regurgitation  Noted on echocardiogram      PT/OT evals and PPD needed/ordered? Yes  Diet:  ADULT DIET;  Regular; 4 carb choices (60 gm/meal)  VTE prophylaxis:  Enoxaparin  Code status: Full Code    Hospital Problems:  Principal Problem:    Stroke-like

## 2023-04-26 LAB — T4 FREE SERPL-MCNC: 0.9 NG/DL (ref 0.78–1.46)

## 2023-04-26 PROCEDURE — G0378 HOSPITAL OBSERVATION PER HR: HCPCS

## 2023-04-26 PROCEDURE — 96372 THER/PROPH/DIAG INJ SC/IM: CPT

## 2023-04-26 PROCEDURE — 36415 COLL VENOUS BLD VENIPUNCTURE: CPT

## 2023-04-26 PROCEDURE — 2580000003 HC RX 258: Performed by: FAMILY MEDICINE

## 2023-04-26 PROCEDURE — 97530 THERAPEUTIC ACTIVITIES: CPT

## 2023-04-26 PROCEDURE — 84439 ASSAY OF FREE THYROXINE: CPT

## 2023-04-26 PROCEDURE — 6370000000 HC RX 637 (ALT 250 FOR IP): Performed by: FAMILY MEDICINE

## 2023-04-26 PROCEDURE — 6360000002 HC RX W HCPCS: Performed by: FAMILY MEDICINE

## 2023-04-26 PROCEDURE — 96376 TX/PRO/DX INJ SAME DRUG ADON: CPT

## 2023-04-26 RX ADMIN — ENOXAPARIN SODIUM 30 MG: 100 INJECTION SUBCUTANEOUS at 20:53

## 2023-04-26 RX ADMIN — SODIUM CHLORIDE, PRESERVATIVE FREE 10 ML: 5 INJECTION INTRAVENOUS at 20:53

## 2023-04-26 RX ADMIN — ONDANSETRON 4 MG: 2 INJECTION INTRAMUSCULAR; INTRAVENOUS at 17:21

## 2023-04-26 RX ADMIN — ASPIRIN 81 MG: 81 TABLET, COATED ORAL at 09:56

## 2023-04-26 RX ADMIN — ONDANSETRON 4 MG: 2 INJECTION INTRAMUSCULAR; INTRAVENOUS at 06:22

## 2023-04-26 RX ADMIN — SODIUM CHLORIDE, PRESERVATIVE FREE 10 ML: 5 INJECTION INTRAVENOUS at 09:56

## 2023-04-26 RX ADMIN — ENOXAPARIN SODIUM 30 MG: 100 INJECTION SUBCUTANEOUS at 09:56

## 2023-04-26 RX ADMIN — ROSUVASTATIN 40 MG: 20 TABLET, FILM COATED ORAL at 20:52

## 2023-04-26 RX ADMIN — TRAZODONE HYDROCHLORIDE 150 MG: 50 TABLET ORAL at 20:53

## 2023-04-26 NOTE — PROGRESS NOTES
Hospitalist Progress Note   Admit Date:  2023 10:13 AM   Name:  Marily Floyd   Age:  58 y.o. Sex:  female  :  1960   MRN:  [id number]   Room:  365/01    Presenting/Chief Complaint: Dizziness     Reason(s) for Admission: Dizziness [R42]  Stroke-like symptoms [R29.90]  Cerebrovascular accident (CVA), unspecified mechanism ([facility] [I63.9]  Does not have primary care provider [facility] Course:   58 y.o. female with medical history of BMI 36, prior stroke who presented with dizziness and weakness. She was at substance use rehab and woke up dizzy. She noted left sided weakness and difficulty walking. 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 She does not take any medications except trazodone. She went to the ED and a code stroke was called. CT concerning for subacute stroke. CT HN with no cerebrovascular findings. She was admitted to observation for further evaluation and management. Subjective & 24hr Events (23): Patient examined at bedside. Assessment & Plan:   Stroke-like symptoms, dizziness  Admission CT concerning for L stroke. CTA HN negative for CV disease. MRI negative  - ?occult arrhythmia  - check TFTs given thyroid nodule visualized on imaging  - restart on telemetry while inpatient  - will need cardiac monitor upon hospital discharge  - check orthostatic vitals  - ASA  - PT, OT, SLP, PPD     Lung nodule  -CT chest in 1 year given significant smoking history     Thyroid nodule  US thyroid confirmed multiple nodule, TSH WNL  - Outpatient follow-up for biopsy of node listed on note from radiology  - check free T4     Psychophysiologic insomnia  -trazodone     BMI 36  Increased risk of all cause mortality, complicating care  - healthy lifestyle at discharge    Mitral regurgitation  Noted on echocardiogram    PT/OT evals and PPD needed/ordered? Yes  Diet:  ADULT DIET;  Regular; 4 carb choices (60 gm/meal)  VTE prophylaxis:  Enoxaparin  Code status: Full

## 2023-04-26 NOTE — PROGRESS NOTES
ACUTE PHYSICAL THERAPY GOALS:   (Developed with and agreed upon by patient and/or caregiver. )    LTG:   (1.)Ms. Leana Pinto will transfer from bed to chair and chair to bed with INDEPENDENT using the least restrictive device within 5 treatment day(s). (2.)Ms. Leana Pinto will ambulate with SUPERVISION for 200 feet with the least restrictive device within 5 treatment day(s). 3.  Ms. Leana Pinto will ambulate up/down 22 steps with one rail and supervision within 5 treatment days. ________________________________________________________________________________________________      PHYSICAL THERAPY Daily Note and AM  (Link to Caseload Tracking: PT Visit Days : 4  Acknowledge Orders  Time In/Out  PT Charge Capture  Rehab Caseload Tracker    Alexy Smith is a 58 y.o. female   PRIMARY DIAGNOSIS: Stroke-like symptoms  Dizziness [R42]  Stroke-like symptoms [R29.90]  Cerebrovascular accident (CVA), unspecified mechanism ([facility] [I63.9]  Does not have primary care provider [Z75.8]       Reason for Referral: Other lack of cordination (R27.8)  Difficulty in walking, Not elsewhere classified (R26.2)  Dizziness and Giddiness (R42)  Observation: Payor: HU[employer] MEDICARE / [facility] / Product Type: *No Product type* /     ASSESSMENT:     REHAB RECOMMENDATIONS:   Recommendation to date pending progress:  Setting:  Short-term Rehab    Equipment:    To Be Determined     ASSESSMENT:  Ms. Leana Pinto admitted with above diagnoses. Patient has a history of CVA affecting her vision but she is independent at base without a device. CT showed \"blurring of the gray-white differentiation centered about he anterior right frontal lobe with mild mass effect\". MRI, however, showed no acute abnormality. Patient currently demonstrates decreased balance, mobility, and independence due to ongoing dizziness. Patient would benefit from therapy to address these deficits.     4/24:  Pt supine upon arrival.  Pt still with dizziness and nausea limiting mobility,

## 2023-04-27 VITALS
SYSTOLIC BLOOD PRESSURE: 119 MMHG | DIASTOLIC BLOOD PRESSURE: 90 MMHG | RESPIRATION RATE: 16 BRPM | HEART RATE: 87 BPM | TEMPERATURE: 98.6 F | OXYGEN SATURATION: 93 % | WEIGHT: 250 LBS | HEIGHT: 69 IN | BODY MASS INDEX: 37.03 KG/M2

## 2023-04-27 LAB
SARS-COV-2 RDRP RESP QL NAA+PROBE: NOT DETECTED
SOURCE: NORMAL

## 2023-04-27 PROCEDURE — 87635 SARS-COV-2 COVID-19 AMP PRB: CPT

## 2023-04-27 PROCEDURE — 6370000000 HC RX 637 (ALT 250 FOR IP): Performed by: INTERNAL MEDICINE

## 2023-04-27 PROCEDURE — 6370000000 HC RX 637 (ALT 250 FOR IP): Performed by: FAMILY MEDICINE

## 2023-04-27 PROCEDURE — G0378 HOSPITAL OBSERVATION PER HR: HCPCS

## 2023-04-27 RX ORDER — SCOLOPAMINE TRANSDERMAL SYSTEM 1 MG/1
1 PATCH, EXTENDED RELEASE TRANSDERMAL
Status: DISCONTINUED | OUTPATIENT
Start: 2023-04-27 | End: 2023-04-27 | Stop reason: HOSPADM

## 2023-04-27 RX ADMIN — ASPIRIN 81 MG: 81 TABLET, COATED ORAL at 09:13

## 2023-04-27 NOTE — PROGRESS NOTES
Attempted to call report to [facility], facility stated they would call this RN back, call back number provided.

## 2023-04-27 NOTE — PROGRESS NOTES
Occupational Therapy Note:    Attempted to see patient this AM for occupational therapy treatment  session. Patient states she has completed all ADL tasks with nursing this morning. Will follow and re-attempt as schedule permits/patient available.  Thank you,    Carolyn Cantu, OT    Rehab Caseload Tracker

## 2023-04-27 NOTE — CARE COORDINATION
04/27/23 1219   Service Assessment   Patient Orientation Alert and Oriented   Cognition Alert   History Provided By Patient   Primary Caregiver Self   Support Systems Friends/Neighbors   Prior Functional Level Independent in ADLs/IADLs   Current Functional Level Assistance with the following:   Can patient return to prior living arrangement No   Financial Resources Medicare   Community Resources [employer]   Discharge Planning   Type of Residence [address]   DME Ordered? No   Potential Assistance Purchasing Medications No   Type of Home Care Services None   Patient expects to be discharged to: [facility] Discharge   Services At/After Discharge [facility])   Condition of Participation: Discharge Planning   The Patient and/or Patient Representative was provided with a Choice of Provider? Patient   The Patient and/Or Patient Representative agree with the Discharge Plan? Yes   Freedom of Choice list was provided with basic dialogue that supports the patient's individualized plan of care/goals, treatment preferences, and shares the quality data associated with the providers? Yes     The patient is discharging to [facility]. The report information was given to the primary nurse and medical transport was arranged for today at 1200. The patient verbalized understanding and agreement with the discharge plan. The facility was notified of the patient's need for an event monitor.
1342: RN CM spoke with [address] [place] [address] and was informed they are unable to accept the patient for services. Discharge planning was discussed with therapy services. 1418: RN CM met with the patient at the bedside and discussed discharge planning. She confirmed she admitted to the hospital from [facility] and is hopeful to return there at discharge. RN CM discussed the recommendation for inpatient rehabilitation and provided her with a list of facilities and their quality metrics. RN CM informed her that [address] [place] [address] is unable to accept her for services. RN CM also discussed short term rehabilitation. The patient was provided with a list of skilled nursing facilities and their quality metrics. She stated she feels she is getting stronger by the day and is hopeful she will not need to discharge to a facility. RN CM encouraged her to ask questions. Case Management will continue to follow her for discharge planning needs.
1438: RN CM met with the patient at the bedside and informed her that she is still pending insurance pre-certification for [facility]. 1447: Message received from [facility] confirming they received authorization. RN CM discussed discharge planning with the attending physician. Per the DO, the patient is a potential discharge tomorrow. RN CM informed the patient of her approval. She stated she prefers to arrange for her own transport. She stated she has some questions for the facility. RN CM encouraged her to call [address] [place] [address]. 1503: The patient called RN CM and stated she was unable to reach anyone at [facility]. RN CM provided the patient with the [address] [place] [address] liaison's phone number.
8290: Discharge planning was discussed with the attending physician. The patient is a potential discharge today to [facility]. She will need a COVID 19 test prior to discharge. The order was confirmed with the attending DO. RN CM sent a message to [facility] and confirmed they can admit the patient today. The report information is not yet available. 0277: [facility] confirmed they can admit the patient today to [facility], [phone]. RN CM spoke with the patient and confirmed she is in agreement with the discharge plan. She confirmed she would like for the hospital to arrange for medical transport. RN CM called [employer] and arranged for a noon pickup. Per [employer], they do not have a wheelchair Hannah Forget available today and the patient does not have a wheelchair to use in a wheelchair van. The report information was given to the nursing staff and the patient was notified of the anticipated transport time. The patient verbalized understanding and agreement with the discharge plan.
Discharge planning was discussed with the Interdisciplinary Team. She is pending insurance pre-certification for [place] and may need an event monitor.
Discharge planning was discussed with the Interdisciplinary Team. The patient is pending repeat therapy evaluations. Per chart review, a referral was sent to [address] [place] [address] on April 22, 2023.
bed offer at [facility]. She inquired about the CMS ratings. NICHO ROY reviewed the ratings with  her. She confirmed she would like to accept the bed offer. The bed offer was accepted in [facility] and RN CM submitted the precert request in [place]. A fax confirmation was received. 1445: RN CM met with the patient at the bedside and informed her that the precert request has been submitted. If approved, she stated she prefers for a friend to transport her to the facility. The Medicare Outpatient Observation Notice was delivered and explained to the patient. She verbalized understanding of the information and signed the letter. The patient was given a copy of the letter and the original was placed in the chart. 1615: RN CM called [facility] and was told the patient is not in their system. RN CM sent a message to [facility] requesting for the facility to initiate pre-certification for short term rehabilitation.

## 2023-04-27 NOTE — DISCHARGE SUMMARY
Hospitalist Discharge Summary   Admit Date:  2023 10:13 AM   DC Note date: 2023  Name:  Biju Gallardo   Age:  58 y.o. Sex:  female  :  1960   MRN:  [id number]   Room:  [facility]/  PCP:  No primary care provider on file. Presenting Complaint: Dizziness     Initial Admission Diagnosis: Dizziness [R42]  Stroke-like symptoms [R29.90]  Cerebrovascular accident (CVA), unspecified mechanism ([facility] [I63.9]  Does not have primary care provider [Z75.8]     Problem List for this Hospitalization (present on admission):    Principal Problem:    Stroke-like symptoms  Active Problems:    Primary insomnia    Class 2 severe obesity due to excess calories with serious comorbidity and body mass index (BMI) of 36.0 to 36.9 in [facility])    History of cerebrovascular accident    Pulmonary nodules    Thyroid nodule    Mitral regurgitation  Resolved Problems:    * No resolved hospital problems. *    58 y.o. female with medical history of BMI 36, prior stroke who presented with dizziness and weakness. She was at substance use rehab and woke up dizzy. She noted left sided weakness and difficulty walking. 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 She does not take any medications except trazodone. She went to the ED and a code stroke was called. CT concerning for subacute stroke. CT HN with no cerebrovascular findings. She was admitted to observation for further evaluation and management. Interval History (): patient examined at bedside. No acute overnight events. Feels well overall. Vertigo has improved, only really happens \"when I'm moving around with therapy. \" Denies headache, blurry vision, fevers/chills, chest pain, shortness of breath, or numbness/tingling/weakness in extremities. Hospital Course:  Patient admitted for stroke-like symptoms in the setting of vertigo/gait disturbances with both MRI and CT imaging being unrevealing for ischemic or hemorrhagic insult.  Orthostatic vitals are normal.  No events on

## 2023-07-12 PROBLEM — J18.9 RIGHT LOWER LOBE PNEUMONIA: Status: RESOLVED | Noted: 2021-11-28 | Resolved: 2023-07-12

## 2023-07-12 PROBLEM — Z86.73 HISTORY OF CEREBROVASCULAR ACCIDENT: Status: RESOLVED | Noted: 2023-04-21 | Resolved: 2023-07-12

## 2023-07-12 PROBLEM — I67.9 CEREBROVASCULAR DISEASE: Status: ACTIVE | Noted: 2023-07-12

## 2023-07-12 PROBLEM — R29.90 STROKE-LIKE SYMPTOMS: Status: RESOLVED | Noted: 2023-04-21 | Resolved: 2023-07-12

## 2023-12-04 ENCOUNTER — OFFICE VISIT (OUTPATIENT)
Dept: INTERNAL MEDICINE CLINIC | Facility: CLINIC | Age: 63
End: 2023-12-04
Payer: MEDICARE

## 2023-12-04 VITALS
WEIGHT: 252 LBS | HEART RATE: 91 BPM | OXYGEN SATURATION: 96 % | BODY MASS INDEX: 39.55 KG/M2 | SYSTOLIC BLOOD PRESSURE: 148 MMHG | TEMPERATURE: 97.7 F | DIASTOLIC BLOOD PRESSURE: 84 MMHG | HEIGHT: 67 IN

## 2023-12-04 DIAGNOSIS — E04.1 THYROID NODULE: ICD-10-CM

## 2023-12-04 DIAGNOSIS — M79.7 FIBROMYALGIA: Primary | ICD-10-CM

## 2023-12-04 DIAGNOSIS — E78.00 ELEVATED LDL CHOLESTEROL LEVEL: ICD-10-CM

## 2023-12-04 PROCEDURE — G8427 DOCREV CUR MEDS BY ELIG CLIN: HCPCS | Performed by: INTERNAL MEDICINE

## 2023-12-04 PROCEDURE — 1036F TOBACCO NON-USER: CPT | Performed by: INTERNAL MEDICINE

## 2023-12-04 PROCEDURE — G8484 FLU IMMUNIZE NO ADMIN: HCPCS | Performed by: INTERNAL MEDICINE

## 2023-12-04 PROCEDURE — 3017F COLORECTAL CA SCREEN DOC REV: CPT | Performed by: INTERNAL MEDICINE

## 2023-12-04 PROCEDURE — 99203 OFFICE O/P NEW LOW 30 MIN: CPT | Performed by: INTERNAL MEDICINE

## 2023-12-04 PROCEDURE — G8417 CALC BMI ABV UP PARAM F/U: HCPCS | Performed by: INTERNAL MEDICINE

## 2023-12-04 RX ORDER — HYDROCODONE BITARTRATE AND ACETAMINOPHEN 10; 325 MG/1; MG/1
1 TABLET ORAL EVERY 8 HOURS PRN
COMMUNITY
Start: 2017-06-14 | End: 2023-12-04 | Stop reason: CLARIF

## 2023-12-04 RX ORDER — TOLTERODINE 2 MG/1
4 CAPSULE, EXTENDED RELEASE ORAL DAILY
COMMUNITY
Start: 2023-09-25 | End: 2023-12-04 | Stop reason: CLARIF

## 2023-12-04 RX ORDER — DULOXETIN HYDROCHLORIDE 60 MG/1
60 CAPSULE, DELAYED RELEASE ORAL 2 TIMES DAILY
COMMUNITY
Start: 2023-08-31 | End: 2023-12-04

## 2023-12-04 RX ORDER — MIRTAZAPINE 15 MG/1
15 TABLET, FILM COATED ORAL NIGHTLY
COMMUNITY
Start: 2017-06-14 | End: 2023-12-04

## 2023-12-04 RX ORDER — DIVALPROEX SODIUM 500 MG/1
500 TABLET, DELAYED RELEASE ORAL 2 TIMES DAILY
COMMUNITY
Start: 2017-06-01 | End: 2023-12-04 | Stop reason: CLARIF

## 2023-12-04 RX ORDER — GABAPENTIN 600 MG/1
600 TABLET ORAL 3 TIMES DAILY
Qty: 270 TABLET | Refills: 1 | Status: SHIPPED | OUTPATIENT
Start: 2023-12-04 | End: 2024-06-01

## 2023-12-04 RX ORDER — LOTEPREDNOL ETABONATE AND TOBRAMYCIN 5; 3 MG/ML; MG/ML
1 SUSPENSION/ DROPS OPHTHALMIC
COMMUNITY
Start: 2022-11-18 | End: 2023-12-04

## 2023-12-04 RX ORDER — ARIPIPRAZOLE 2 MG/1
2 TABLET ORAL DAILY
COMMUNITY
Start: 2023-08-31 | End: 2023-12-04 | Stop reason: CLARIF

## 2023-12-04 RX ORDER — HYDROXYZINE PAMOATE 25 MG/1
25 CAPSULE ORAL EVERY 4 HOURS PRN
COMMUNITY
Start: 2017-06-14 | End: 2023-12-04 | Stop reason: CLARIF

## 2023-12-04 RX ORDER — PREGABALIN 100 MG/1
CAPSULE ORAL
COMMUNITY
Start: 2023-09-25 | End: 2023-12-04

## 2023-12-04 RX ORDER — ASPIRIN 81 MG/1
81 TABLET, CHEWABLE ORAL DAILY
COMMUNITY
Start: 2023-07-19

## 2023-12-04 RX ORDER — DIAZEPAM 10 MG/1
10 TABLET ORAL
COMMUNITY
Start: 2021-11-14 | End: 2023-12-04 | Stop reason: CLARIF

## 2023-12-04 RX ORDER — ZOLPIDEM TARTRATE 10 MG/1
10 TABLET ORAL
COMMUNITY
Start: 2021-11-18 | End: 2023-12-04 | Stop reason: CLARIF

## 2023-12-04 RX ORDER — PANTOPRAZOLE SODIUM 40 MG/1
40 TABLET, DELAYED RELEASE ORAL DAILY
COMMUNITY
Start: 2017-06-14 | End: 2023-12-04 | Stop reason: CLARIF

## 2023-12-04 RX ORDER — GABAPENTIN 600 MG/1
600 TABLET ORAL 3 TIMES DAILY
COMMUNITY
Start: 2017-06-01

## 2023-12-04 RX ORDER — OLANZAPINE 20 MG/1
20 TABLET ORAL NIGHTLY
COMMUNITY
End: 2023-12-04 | Stop reason: CLARIF

## 2023-12-04 RX ORDER — MELOXICAM 15 MG/1
15 TABLET ORAL DAILY
COMMUNITY
Start: 2023-11-02 | End: 2023-12-04 | Stop reason: CLARIF

## 2023-12-04 RX ORDER — ATORVASTATIN CALCIUM 40 MG/1
40 TABLET, FILM COATED ORAL
COMMUNITY
Start: 2023-11-03 | End: 2023-12-04 | Stop reason: CLARIF

## 2023-12-04 RX ORDER — PRAZOSIN HYDROCHLORIDE 2 MG/1
2 CAPSULE ORAL NIGHTLY
COMMUNITY
Start: 2017-06-14

## 2023-12-04 RX ORDER — GABAPENTIN 400 MG/1
CAPSULE ORAL
COMMUNITY
End: 2023-12-04

## 2023-12-04 RX ORDER — DEXTROAMPHETAMINE SACCHARATE, AMPHETAMINE ASPARTATE, DEXTROAMPHETAMINE SULFATE AND AMPHETAMINE SULFATE 7.5; 7.5; 7.5; 7.5 MG/1; MG/1; MG/1; MG/1
30 TABLET ORAL 2 TIMES DAILY
COMMUNITY
Start: 2015-02-10 | End: 2023-12-04 | Stop reason: CLARIF

## 2023-12-04 RX ORDER — SCOLOPAMINE TRANSDERMAL SYSTEM 1 MG/1
PATCH, EXTENDED RELEASE TRANSDERMAL
COMMUNITY
End: 2023-12-04

## 2023-12-04 RX ORDER — BROMFENAC SODIUM 0.7 MG/ML
SOLUTION/ DROPS OPHTHALMIC
COMMUNITY
Start: 2022-08-04 | End: 2023-12-04 | Stop reason: CLARIF

## 2023-12-04 RX ORDER — BUPROPION HYDROCHLORIDE 150 MG/1
300 TABLET, EXTENDED RELEASE ORAL 2 TIMES DAILY
COMMUNITY
Start: 2017-06-01 | End: 2023-12-04 | Stop reason: CLARIF

## 2023-12-04 SDOH — ECONOMIC STABILITY: INCOME INSECURITY: HOW HARD IS IT FOR YOU TO PAY FOR THE VERY BASICS LIKE FOOD, HOUSING, MEDICAL CARE, AND HEATING?: NOT HARD AT ALL

## 2023-12-04 SDOH — ECONOMIC STABILITY: FOOD INSECURITY: WITHIN THE PAST 12 MONTHS, THE FOOD YOU BOUGHT JUST DIDN'T LAST AND YOU DIDN'T HAVE MONEY TO GET MORE.: NEVER TRUE

## 2023-12-04 SDOH — ECONOMIC STABILITY: HOUSING INSECURITY
IN THE LAST 12 MONTHS, WAS THERE A TIME WHEN YOU DID NOT HAVE A STEADY PLACE TO SLEEP OR SLEPT IN A SHELTER (INCLUDING NOW)?: NO

## 2023-12-04 SDOH — ECONOMIC STABILITY: FOOD INSECURITY: WITHIN THE PAST 12 MONTHS, YOU WORRIED THAT YOUR FOOD WOULD RUN OUT BEFORE YOU GOT MONEY TO BUY MORE.: NEVER TRUE

## 2023-12-04 ASSESSMENT — PATIENT HEALTH QUESTIONNAIRE - PHQ9
7. TROUBLE CONCENTRATING ON THINGS, SUCH AS READING THE NEWSPAPER OR WATCHING TELEVISION: 0
1. LITTLE INTEREST OR PLEASURE IN DOING THINGS: 0
SUM OF ALL RESPONSES TO PHQ QUESTIONS 1-9: 0
5. POOR APPETITE OR OVEREATING: 0
SUM OF ALL RESPONSES TO PHQ QUESTIONS 1-9: 0
9. THOUGHTS THAT YOU WOULD BE BETTER OFF DEAD, OR OF HURTING YOURSELF: 0
4. FEELING TIRED OR HAVING LITTLE ENERGY: 0
6. FEELING BAD ABOUT YOURSELF - OR THAT YOU ARE A FAILURE OR HAVE LET YOURSELF OR YOUR FAMILY DOWN: 0
SUM OF ALL RESPONSES TO PHQ QUESTIONS 1-9: 0
3. TROUBLE FALLING OR STAYING ASLEEP: 0
10. IF YOU CHECKED OFF ANY PROBLEMS, HOW DIFFICULT HAVE THESE PROBLEMS MADE IT FOR YOU TO DO YOUR WORK, TAKE CARE OF THINGS AT HOME, OR GET ALONG WITH OTHER PEOPLE: 0
8. MOVING OR SPEAKING SO SLOWLY THAT OTHER PEOPLE COULD HAVE NOTICED. OR THE OPPOSITE, BEING SO FIGETY OR RESTLESS THAT YOU HAVE BEEN MOVING AROUND A LOT MORE THAN USUAL: 0
SUM OF ALL RESPONSES TO PHQ9 QUESTIONS 1 & 2: 0
2. FEELING DOWN, DEPRESSED OR HOPELESS: 0
SUM OF ALL RESPONSES TO PHQ QUESTIONS 1-9: 0

## 2023-12-04 ASSESSMENT — ANXIETY QUESTIONNAIRES
2. NOT BEING ABLE TO STOP OR CONTROL WORRYING: 0
7. FEELING AFRAID AS IF SOMETHING AWFUL MIGHT HAPPEN: 0
GAD7 TOTAL SCORE: 0
6. BECOMING EASILY ANNOYED OR IRRITABLE: 0
IF YOU CHECKED OFF ANY PROBLEMS ON THIS QUESTIONNAIRE, HOW DIFFICULT HAVE THESE PROBLEMS MADE IT FOR YOU TO DO YOUR WORK, TAKE CARE OF THINGS AT HOME, OR GET ALONG WITH OTHER PEOPLE: NOT DIFFICULT AT ALL
3. WORRYING TOO MUCH ABOUT DIFFERENT THINGS: 0
4. TROUBLE RELAXING: 0
1. FEELING NERVOUS, ANXIOUS, OR ON EDGE: 0
5. BEING SO RESTLESS THAT IT IS HARD TO SIT STILL: 0

## 2023-12-04 ASSESSMENT — ENCOUNTER SYMPTOMS
ABDOMINAL PAIN: 0
SHORTNESS OF BREATH: 0

## 2023-12-04 ASSESSMENT — COLUMBIA-SUICIDE SEVERITY RATING SCALE - C-SSRS
4. HAVE YOU HAD THESE THOUGHTS AND HAD SOME INTENTION OF ACTING ON THEM?: NO
5. HAVE YOU STARTED TO WORK OUT OR WORKED OUT THE DETAILS OF HOW TO KILL YOURSELF? DO YOU INTEND TO CARRY OUT THIS PLAN?: NO
3. HAVE YOU BEEN THINKING ABOUT HOW YOU MIGHT KILL YOURSELF?: NO

## 2023-12-04 NOTE — PROGRESS NOTES
Assessment/Plan:  Jonnathan Parmar was seen today for new patient. Diagnoses and all orders for this visit:    Fibromyalgia  -     gabapentin (NEURONTIN) 600 MG tablet; Take 1 tablet by mouth 3 times daily for 180 days. Elevated LDL cholesterol level    Thyroid nodule    She is not taking any other medication now    Return in about 6 months (around 6/4/2024), or if symptoms worsen or fail to improve.   __  Hiren Weems M.D.

## 2023-12-13 ENCOUNTER — OFFICE VISIT (OUTPATIENT)
Dept: INTERNAL MEDICINE CLINIC | Facility: CLINIC | Age: 63
End: 2023-12-13
Payer: MEDICARE

## 2023-12-13 VITALS
TEMPERATURE: 98.2 F | HEART RATE: 82 BPM | WEIGHT: 255.5 LBS | BODY MASS INDEX: 40.1 KG/M2 | SYSTOLIC BLOOD PRESSURE: 133 MMHG | HEIGHT: 67 IN | OXYGEN SATURATION: 97 % | DIASTOLIC BLOOD PRESSURE: 81 MMHG

## 2023-12-13 DIAGNOSIS — J11.1 INFLUENZA-LIKE ILLNESS: Primary | ICD-10-CM

## 2023-12-13 DIAGNOSIS — R50.81 FEVER IN OTHER DISEASES: ICD-10-CM

## 2023-12-13 DIAGNOSIS — R11.2 NAUSEA AND VOMITING, UNSPECIFIED VOMITING TYPE: ICD-10-CM

## 2023-12-13 LAB
INFLUENZA A ANTIGEN, POC: NEGATIVE
INFLUENZA B ANTIGEN, POC: NEGATIVE
LOT EXPIRE DATE: NORMAL
LOT KIT NUMBER: NORMAL
SARS-COV-2, POC: NORMAL
VALID INTERNAL CONTROL, POC: NEGATIVE
VALID INTERNAL CONTROL: NEGATIVE
VENDOR AND KIT NAME POC: NORMAL

## 2023-12-13 PROCEDURE — G8484 FLU IMMUNIZE NO ADMIN: HCPCS | Performed by: INTERNAL MEDICINE

## 2023-12-13 PROCEDURE — 99214 OFFICE O/P EST MOD 30 MIN: CPT | Performed by: INTERNAL MEDICINE

## 2023-12-13 PROCEDURE — G8417 CALC BMI ABV UP PARAM F/U: HCPCS | Performed by: INTERNAL MEDICINE

## 2023-12-13 PROCEDURE — 3017F COLORECTAL CA SCREEN DOC REV: CPT | Performed by: INTERNAL MEDICINE

## 2023-12-13 PROCEDURE — G8427 DOCREV CUR MEDS BY ELIG CLIN: HCPCS | Performed by: INTERNAL MEDICINE

## 2023-12-13 PROCEDURE — 1036F TOBACCO NON-USER: CPT | Performed by: INTERNAL MEDICINE

## 2023-12-13 PROCEDURE — 87804 INFLUENZA ASSAY W/OPTIC: CPT | Performed by: INTERNAL MEDICINE

## 2023-12-13 PROCEDURE — 87426 SARSCOV CORONAVIRUS AG IA: CPT | Performed by: INTERNAL MEDICINE

## 2023-12-13 RX ORDER — ONDANSETRON 4 MG/1
4 TABLET, ORALLY DISINTEGRATING ORAL 3 TIMES DAILY PRN
Qty: 21 TABLET | Refills: 0 | Status: SHIPPED | OUTPATIENT
Start: 2023-12-13

## 2023-12-13 ASSESSMENT — ENCOUNTER SYMPTOMS
DIARRHEA: 1
VOMITING: 1
ABDOMINAL PAIN: 0
NAUSEA: 1

## 2023-12-13 NOTE — PROGRESS NOTES
Fede Link M.D. Internal Medicine  [facility], [address] [place] [address]  Office : [phone]  Fax : [phone]    Chief Complaint   Patient presents with    Fatigue     Nausea and low grade fever x 3 days, started Saturday        History of Present Illness:  Haim Jimenez is a 61 y.o. female. Nausea & Vomiting  This is a new problem. The current episode started in the past 7 days. The problem occurs constantly. The problem has been unchanged. Associated symptoms include chills, diaphoresis, fatigue, a fever, nausea and vomiting. Pertinent negatives include no abdominal pain, arthralgias or myalgias. Nothing aggravates the symptoms. She has tried nothing for the symptoms. The treatment provided no relief. Several residents of her house have Influenza. She had fever >101 for one day      Past Medical History:  Past Medical History:   Diagnosis Date    Adult residual type attention deficit hyperactivity disorder (ADHD) 11/13/2020    Backache 12/16/2014    Bipolar 1 disorder, manic, mild ([address]) 6/15/2021    Bipolar affective disorder ([address]) 1/30/2014    Blindness left eye category 4, normal vision right eye 3/4/2022    Cerebrovascular disease 7/12/2023    Cigarette nicotine dependence without complication 9/6/8201    Class 2 severe obesity due to excess calories with serious comorbidity and body mass index (BMI) of 36.0 to 36.9 in adult [facility]) 4/21/2023    Confusion and disorientation 6/1/2017    Debility 6/13/2021    Elevated LDL cholesterol level 3/4/2022    History of cerebrovascular accident 4/21/2023    Mitral regurgitation 4/22/2023    Neuropathy 9/15/2020    Primary insomnia 9/15/2020    Pulmonary nodules 4/21/2023    Needs follow up CT 4/2024    Right lower lobe pneumonia 11/28/2021    Schizophrenia ([address]) 1/30/2014    Stroke-like symptoms 4/21/2023    Thyroid nodule 4/21/2023    Bilateral thyroid nodules.  Recommend biopsy of lesion #2 in the

## 2023-12-13 NOTE — PATIENT INSTRUCTIONS
Diet until improved:    Bananas  Rice   Apple Sauce  Toast    Room temp Gatorade, herbal tea or water to drink.   No caffeine    Immodium AD and/or pepto bismol for diarrhea

## 2024-01-10 ENCOUNTER — OFFICE VISIT (OUTPATIENT)
Dept: INTERNAL MEDICINE CLINIC | Facility: CLINIC | Age: 64
End: 2024-01-10
Payer: MEDICARE

## 2024-01-10 VITALS
SYSTOLIC BLOOD PRESSURE: 122 MMHG | TEMPERATURE: 98.1 F | WEIGHT: 254 LBS | DIASTOLIC BLOOD PRESSURE: 73 MMHG | HEART RATE: 100 BPM | BODY MASS INDEX: 39.87 KG/M2 | OXYGEN SATURATION: 97 % | HEIGHT: 67 IN

## 2024-01-10 DIAGNOSIS — J06.9 UPPER RESPIRATORY TRACT INFECTION, UNSPECIFIED TYPE: Primary | ICD-10-CM

## 2024-01-10 DIAGNOSIS — M79.7 FIBROMYALGIA: ICD-10-CM

## 2024-01-10 DIAGNOSIS — N39.3 STRESS INCONTINENCE: ICD-10-CM

## 2024-01-10 PROCEDURE — 3017F COLORECTAL CA SCREEN DOC REV: CPT | Performed by: INTERNAL MEDICINE

## 2024-01-10 PROCEDURE — 1036F TOBACCO NON-USER: CPT | Performed by: INTERNAL MEDICINE

## 2024-01-10 PROCEDURE — G8417 CALC BMI ABV UP PARAM F/U: HCPCS | Performed by: INTERNAL MEDICINE

## 2024-01-10 PROCEDURE — G8427 DOCREV CUR MEDS BY ELIG CLIN: HCPCS | Performed by: INTERNAL MEDICINE

## 2024-01-10 PROCEDURE — 99214 OFFICE O/P EST MOD 30 MIN: CPT | Performed by: INTERNAL MEDICINE

## 2024-01-10 PROCEDURE — G8484 FLU IMMUNIZE NO ADMIN: HCPCS | Performed by: INTERNAL MEDICINE

## 2024-01-10 RX ORDER — GABAPENTIN 800 MG/1
800 TABLET ORAL 3 TIMES DAILY
Qty: 270 TABLET | Refills: 1 | Status: SHIPPED | OUTPATIENT
Start: 2024-01-10 | End: 2024-07-08

## 2024-01-10 RX ORDER — GABAPENTIN 800 MG/1
800 TABLET ORAL 3 TIMES DAILY
Qty: 270 TABLET | Refills: 1 | Status: SHIPPED | COMMUNITY
Start: 2024-01-10 | End: 2024-01-10 | Stop reason: SDUPTHER

## 2024-01-10 ASSESSMENT — PATIENT HEALTH QUESTIONNAIRE - PHQ9
6. FEELING BAD ABOUT YOURSELF - OR THAT YOU ARE A FAILURE OR HAVE LET YOURSELF OR YOUR FAMILY DOWN: 0
SUM OF ALL RESPONSES TO PHQ QUESTIONS 1-9: 0
SUM OF ALL RESPONSES TO PHQ QUESTIONS 1-9: 0
1. LITTLE INTEREST OR PLEASURE IN DOING THINGS: 0
SUM OF ALL RESPONSES TO PHQ9 QUESTIONS 1 & 2: 0
3. TROUBLE FALLING OR STAYING ASLEEP: 0
5. POOR APPETITE OR OVEREATING: 0
8. MOVING OR SPEAKING SO SLOWLY THAT OTHER PEOPLE COULD HAVE NOTICED. OR THE OPPOSITE, BEING SO FIGETY OR RESTLESS THAT YOU HAVE BEEN MOVING AROUND A LOT MORE THAN USUAL: 0
10. IF YOU CHECKED OFF ANY PROBLEMS, HOW DIFFICULT HAVE THESE PROBLEMS MADE IT FOR YOU TO DO YOUR WORK, TAKE CARE OF THINGS AT HOME, OR GET ALONG WITH OTHER PEOPLE: 0
9. THOUGHTS THAT YOU WOULD BE BETTER OFF DEAD, OR OF HURTING YOURSELF: 0
2. FEELING DOWN, DEPRESSED OR HOPELESS: 0
SUM OF ALL RESPONSES TO PHQ QUESTIONS 1-9: 0
SUM OF ALL RESPONSES TO PHQ QUESTIONS 1-9: 0
4. FEELING TIRED OR HAVING LITTLE ENERGY: 0
7. TROUBLE CONCENTRATING ON THINGS, SUCH AS READING THE NEWSPAPER OR WATCHING TELEVISION: 0

## 2024-01-10 ASSESSMENT — ENCOUNTER SYMPTOMS: COUGH: 1

## 2024-01-10 NOTE — PROGRESS NOTES
Musculoskeletal:  Negative for arthralgias and myalgias.   Skin:  Negative for rash.   Neurological:  Negative for speech difficulty.   Psychiatric/Behavioral:  Negative for dysphoric mood.          Vital Signs  /73 (Site: Left Upper Arm, Position: Sitting, Cuff Size: Large Adult)   Pulse 100   Temp 98.1 °F (36.7 °C) (Temporal)   Ht 1.702 m (5' 7\")   Wt 115.2 kg (254 lb)   SpO2 97%   BMI 39.78 kg/m²   Body mass index is 39.78 kg/m².    Physical Exam  Vitals reviewed.   Constitutional:       General: She is not in acute distress.     Appearance: Normal appearance. She is not ill-appearing.   HENT:      Head: Normocephalic and atraumatic.   Eyes:      General: No scleral icterus.     Conjunctiva/sclera: Conjunctivae normal.   Neck:      Vascular: No carotid bruit.   Cardiovascular:      Rate and Rhythm: Normal rate and regular rhythm.      Heart sounds: Normal heart sounds. No murmur heard.  Pulmonary:      Effort: Pulmonary effort is normal.      Breath sounds: Normal breath sounds.   Musculoskeletal:         General: No swelling.   Skin:     Coloration: Skin is not jaundiced.      Findings: No rash.   Neurological:      General: No focal deficit present.      Mental Status: She is alert. Mental status is at baseline.      Cranial Nerves: No cranial nerve deficit.      Motor: No weakness.      Gait: Gait normal.   Psychiatric:         Mood and Affect: Mood normal.         Behavior: Behavior normal.         Thought Content: Thought content normal.         Judgment: Judgment normal.           Assessment/Plan:  Tanya was seen today for cough and incontinence.    Diagnoses and all orders for this visit:    Upper respiratory tract infection, unspecified type    Stress incontinence  -     [facility], [place]    Fibromyalgia  -     gabapentin (NEURONTIN) 800 MG tablet; Take 1 tablet by mouth 3 times daily for 180 days.    Complete course of doxycycline    Return if symptoms worsen or fail to

## 2024-02-15 ENCOUNTER — OFFICE VISIT (OUTPATIENT)
Dept: INTERNAL MEDICINE CLINIC | Facility: CLINIC | Age: 64
End: 2024-02-15
Payer: MEDICARE

## 2024-02-15 VITALS
WEIGHT: 248 LBS | SYSTOLIC BLOOD PRESSURE: 134 MMHG | OXYGEN SATURATION: 97 % | HEART RATE: 84 BPM | TEMPERATURE: 97.9 F | DIASTOLIC BLOOD PRESSURE: 72 MMHG | HEIGHT: 67 IN | BODY MASS INDEX: 38.92 KG/M2

## 2024-02-15 DIAGNOSIS — F31.11 BIPOLAR 1 DISORDER, MANIC, MILD (HCC): Primary | ICD-10-CM

## 2024-02-15 DIAGNOSIS — F98.8 ATTENTION DEFICIT DISORDER, UNSPECIFIED HYPERACTIVITY PRESENCE: ICD-10-CM

## 2024-02-15 DIAGNOSIS — R51.9 NONINTRACTABLE HEADACHE, UNSPECIFIED CHRONICITY PATTERN, UNSPECIFIED HEADACHE TYPE: ICD-10-CM

## 2024-02-15 DIAGNOSIS — F20.9 SCHIZOPHRENIA, UNSPECIFIED TYPE (HCC): ICD-10-CM

## 2024-02-15 DIAGNOSIS — F31.9 BIPOLAR AFFECTIVE DISORDER, REMISSION STATUS UNSPECIFIED (HCC): ICD-10-CM

## 2024-02-15 PROCEDURE — 99214 OFFICE O/P EST MOD 30 MIN: CPT | Performed by: INTERNAL MEDICINE

## 2024-02-15 PROCEDURE — G8427 DOCREV CUR MEDS BY ELIG CLIN: HCPCS | Performed by: INTERNAL MEDICINE

## 2024-02-15 PROCEDURE — G8417 CALC BMI ABV UP PARAM F/U: HCPCS | Performed by: INTERNAL MEDICINE

## 2024-02-15 PROCEDURE — 1036F TOBACCO NON-USER: CPT | Performed by: INTERNAL MEDICINE

## 2024-02-15 PROCEDURE — 3017F COLORECTAL CA SCREEN DOC REV: CPT | Performed by: INTERNAL MEDICINE

## 2024-02-15 PROCEDURE — G8484 FLU IMMUNIZE NO ADMIN: HCPCS | Performed by: INTERNAL MEDICINE

## 2024-02-15 RX ORDER — QUETIAPINE FUMARATE 25 MG/1
25 TABLET, FILM COATED ORAL
Qty: 30 TABLET | Refills: 1 | Status: SHIPPED | OUTPATIENT
Start: 2024-02-15

## 2024-02-15 RX ORDER — SUMATRIPTAN 100 MG/1
100 TABLET, FILM COATED ORAL
Qty: 9 TABLET | Refills: 5 | Status: SHIPPED | OUTPATIENT
Start: 2024-02-15 | End: 2024-02-16 | Stop reason: SINTOL

## 2024-02-15 NOTE — PROGRESS NOTES
[facility]  Yuriy Hollis M.D.  Internal Medicine  [address] [place] [address]  Office : [phone]  Fax : [phone]    Chief Complaint   Patient presents with    Depression     Depression/ headaches/ crying/ angry and sad with in the last month        History of Present Illness:  Tanya Bailey is a 63 y.o. female.  Depression  Visit Type: initial  Onset of symptoms: 1-4 weeks ago  Progression since onset: waxing and waning  Patient presents with the following symptoms: depressed mood, excessive worry, insomnia, irritability and nervousness/anxiety.  Patient is not experiencing: suicidal ideas, suicidal planning and thoughts of death.  Frequency of symptoms: most days   Severity: causing significant distress   Sleep quality: poor  Risk factors: previous episode of depression  Patient has a history of: bipolar disorder, depression, fibromyalgia and mental illness  Compliance with treatment: poor      Headache  Having headaches with nausea that persist perhaps triggered by stress      Past Medical History:  Past Medical History:   Diagnosis Date    Adult residual type attention deficit hyperactivity disorder (ADHD) 11/13/2020    Backache 12/16/2014    Bipolar 1 disorder, manic, mild (H[facility]) 6/15/2021    Bipolar affective disorder (HCC) 1/30/2014    Blindness left eye category 4, normal vision right eye 3/4/2022    Cerebrovascular disease 7/12/2023    Cigarette nicotine dependence without complication 3/4/2022    Class 2 severe obesity due to excess calories with serious comorbidity and body mass index (BMI) of 36.0 to 36.9 in adult (HCC) 4/21/2023    Confusion and disorientation 6/1/2017    Debility 6/13/2021    Elevated LDL cholesterol level 3/4/2022    History of cerebrovascular accident 4/21/2023    Mitral regurgitation 4/22/2023    Neuropathy 9/15/2020    Primary insomnia 9/15/2020    Pulmonary nodules 4/21/2023    Needs follow up CT 4/2024    Right lower

## 2024-02-16 ENCOUNTER — OFFICE VISIT (OUTPATIENT)
Dept: OBGYN CLINIC | Age: 64
End: 2024-02-16

## 2024-02-16 VITALS
SYSTOLIC BLOOD PRESSURE: 128 MMHG | HEIGHT: 67 IN | BODY MASS INDEX: 38.52 KG/M2 | WEIGHT: 245.4 LBS | DIASTOLIC BLOOD PRESSURE: 70 MMHG

## 2024-02-16 DIAGNOSIS — N39.46 MIXED INCONTINENCE URGE AND STRESS: Primary | ICD-10-CM

## 2024-02-16 DIAGNOSIS — N81.11 CYSTOCELE, MIDLINE: ICD-10-CM

## 2024-02-16 PROBLEM — R53.81 DEBILITY: Status: RESOLVED | Noted: 2021-06-13 | Resolved: 2024-02-16

## 2024-02-16 PROBLEM — F17.200 TOBACCO USE DISORDER: Status: RESOLVED | Noted: 2020-09-15 | Resolved: 2024-02-16

## 2024-02-16 RX ORDER — ONDANSETRON 4 MG/1
4 TABLET, ORALLY DISINTEGRATING ORAL 3 TIMES DAILY PRN
Qty: 21 TABLET | Refills: 0 | Status: SHIPPED | OUTPATIENT
Start: 2024-02-16

## 2024-02-16 RX ORDER — TOLTERODINE 4 MG/1
4 CAPSULE, EXTENDED RELEASE ORAL DAILY
Qty: 30 CAPSULE | Refills: 3 | Status: SHIPPED | OUTPATIENT
Start: 2024-02-16

## 2024-02-16 NOTE — PATIENT INSTRUCTIONS
Please do the Kegel exercises as we discussed. Make sure you try to go to the bathroom every 2-3 hours throughout the day, don't have anything to drink after dinner and go to the bathroom right before bed to help retrain your bladder to work better.   Please also take your new medicine as prescribed  Follow up in 2-3 months for a recheck  Thanks for coming to see us today and letting us take care of you!

## 2024-02-16 NOTE — PROGRESS NOTES
New patient referred her for urinary incontinence that started in 12/2023. Patient states that she experiences leakage when standing and walking as well as leakage when she sneezes and coughs.     LAST PAP:  s/p hysterectomy-due to AUB per patient . Last pap smear approx 2 years, denies hx of abnormal pap smears.     LAST MAMMO:  9/7/23    LMP:  No LMP recorded. Patient has had a hysterectomy.    BIRTH CONTROL:  status post hysterectomy    TOBACCO USE:  No    FAMILY HISTORY OF:   Breast Cancer:  Yes-mother, maternal aunt    Ovarian Cancer:  No   Uterine Cancer:  No   Colon Cancer:  yes-father and paternal grandfather     Vitals:    02/16/24 0931   BP: 128/70   Site: Right Upper Arm   Position: Sitting   Weight: 111.3 kg (245 lb 6.4 oz)   Height: 1.702 m (5' 7\")        JORGE A PADILLA RN  02/16/24  9:45 AM

## 2024-02-16 NOTE — TELEPHONE ENCOUNTER
The pt called stating the medication she was given yesterday for her migraine did not work and actually made her sick (very nauseaous).  She would like to see if there is anything else she can be given.   She also notes she is out of the nausea medication and would like a refill.   Pt CB# [phone]  It appears this medication (Zofran 4mg 1 po TID) is on her past medication list and was D/C'd by a Nataly Richards RN on 2/16/24 at 0941.   I will forward this to Dr. Hollis for him to advise and to see if he is willing to resend the Zofran

## 2024-02-16 NOTE — PROGRESS NOTES
[facility]  [address], [place] [address]  [phone]    Herrera Puentes MD, FACOG  [facility]  Shari Zuñiga MD, FACOG    Assessment/Plan     Patient Active Problem List    Diagnosis Date Noted    Mixed incontinence urge and stress 2024     Overview Note:     noted      Cystocele, midline 2024     Overview Note:     24:  second degree with valsalva      Cerebrovascular disease 2023    Mitral regurgitation 2023    Class 2 severe obesity due to excess calories with serious comorbidity and body mass index (BMI) of 36.0 to 36.9 in adult ([facility]) 2023    Pulmonary nodules 2023     Overview Note:     Needs follow up CT 2024      Thyroid nodule 2023     Overview Note:     Bilateral thyroid nodules. Recommend biopsy of lesion #2 in the right thyroid lobe and the left thyroid nodule.      Elevated LDL cholesterol level 2022    Blindness left eye category 4, normal vision right eye 2022    Bipolar 1 disorder, manic, mild ([facility]) 06/15/2021    Adult residual type attention deficit hyperactivity disorder (ADHD) 2020    Primary insomnia 09/15/2020    Neuropathy 09/15/2020    Schizophrenia ([facility]) 2014    Bipolar affective disorder ([facility]) 2014    ADHD (attention deficit hyperactivity disorder) 2014       Problem List Items Addressed This Visit       Mixed incontinence urge and stress - Primary    Cystocele, midline        Subjective     LAST PAP:  s/p hysterectomy-due to AUB per patient . Last pap smear approx 2 years, denies hx of abnormal pap smears.      LAST MAMMO:  23     LMP:  No LMP recorded. Patient has had a hysterectomy.     BIRTH CONTROL:  status post hysterectomy     TOBACCO USE:  No    Tanya Leo 63 y.o.  presents today for incontinence.  Pt reports this has been an issue for over 1-2 years but has worsened since last fall.  (+) leaks with cough, sneeze.  Rare urge sensation.  Denies any

## 2024-02-19 ENCOUNTER — NURSE TRIAGE (OUTPATIENT)
Dept: OTHER | Facility: CLINIC | Age: 64
End: 2024-02-19

## 2024-02-19 NOTE — TELEPHONE ENCOUNTER
Location of patient: [place]    Received call from Newton at [facility]; Patient with Red Flag Complaint requesting to establish care with [facility].    Subjective: Caller states \" It's been two weeks, Kinsey never had this before. I'm waking up at night- I've had some extra stress, they are not consistent but they do hurt so bad\"   States more profound in the am- \"I did see a doctor last week that gave me imitrex, it made me very sick- so they gave me nausea medicine- the medicine did not help\" MD provided seroquel as well.  Current Symptoms: Headache- Left and right temple    Onset: 2 weeks ago; waxing and waning    Associated Symptoms: irritability     Pain Severity: 6/10; aching; waxing and waning    Temperature: denies     What has been tried: OTC medication, imitrex prescribed by doctor      Recommended disposition: See in Office Today or Tomorrow  Alternate dispo of reaching out to established physician if unable to be seen in timeframe    Care advice provided, patient verbalizes understanding; denies any other questions or concerns; instructed to call back for any new or worsening symptoms.    Patient/Caller agrees with recommended disposition; writer provided warm transfer to Tim at [facility] for appointment scheduling    Attention Provider:  Thank you for allowing me to participate in the care of your patient.  The patient was connected to triage in response to information provided to the [facility].  Please do not respond through this encounter as the response is not directed to a shared pool.      Reason for Disposition   New headache and age > 50    Protocols used: Headache-ADULT-[place]

## 2024-02-26 ENCOUNTER — TELEPHONE (OUTPATIENT)
Dept: INTERNAL MEDICINE CLINIC | Facility: CLINIC | Age: 64
End: 2024-02-26

## 2024-02-26 DIAGNOSIS — F20.9 SCHIZOPHRENIA, UNSPECIFIED TYPE (HCC): ICD-10-CM

## 2024-02-26 DIAGNOSIS — F31.9 BIPOLAR AFFECTIVE DISORDER, REMISSION STATUS UNSPECIFIED (HCC): ICD-10-CM

## 2024-02-26 DIAGNOSIS — F31.11 BIPOLAR 1 DISORDER, MANIC, MILD (HCC): ICD-10-CM

## 2024-02-26 RX ORDER — QUETIAPINE FUMARATE 100 MG/1
100 TABLET, FILM COATED ORAL
Qty: 90 TABLET | Refills: 0 | Status: SHIPPED | OUTPATIENT
Start: 2024-02-26

## 2024-02-26 NOTE — TELEPHONE ENCOUNTER
----- Message from Mary Alfaro sent at 2/22/2024  1:31 PM EST -----  Subject: Medication Problem     Medication: QUEtiapine (SEROQUEL) 25 MG tablet  Dosage: 25 MG tablet 1 x nightly  Ordering Provider: Yuriy Hollis    Question/Problem: Patient has been taking two tablets per night equaling   50 Mg and states that is not helping her sleep and would like PCP to   increase dosage to 75or 100 MG. Please call to discuss asap      Pharmacy: [id number] - [place], [place] - [address] - P   [phone] - F [phone]    ---------------------------------------------------------------------------  --------------  CALL BACK INFO  [phone]; OK to leave message on voicemail  ---------------------------------------------------------------------------  --------------    SCRIPT ANSWERS  Relationship to Patient: Self

## 2024-03-28 ENCOUNTER — OFFICE VISIT (OUTPATIENT)
Dept: INTERNAL MEDICINE CLINIC | Facility: CLINIC | Age: 64
End: 2024-03-28

## 2024-03-28 VITALS
HEIGHT: 67 IN | DIASTOLIC BLOOD PRESSURE: 76 MMHG | TEMPERATURE: 98 F | SYSTOLIC BLOOD PRESSURE: 137 MMHG | BODY MASS INDEX: 38.14 KG/M2 | WEIGHT: 243 LBS | HEART RATE: 74 BPM | OXYGEN SATURATION: 98 %

## 2024-03-28 DIAGNOSIS — F20.9 SCHIZOPHRENIA, UNSPECIFIED TYPE (HCC): ICD-10-CM

## 2024-03-28 DIAGNOSIS — F31.9 BIPOLAR AFFECTIVE DISORDER, REMISSION STATUS UNSPECIFIED (HCC): ICD-10-CM

## 2024-03-28 DIAGNOSIS — F31.11 BIPOLAR 1 DISORDER, MANIC, MILD (HCC): ICD-10-CM

## 2024-03-28 DIAGNOSIS — Z87.898 HISTORY OF MOTION SICKNESS: ICD-10-CM

## 2024-03-28 DIAGNOSIS — M79.7 FIBROMYALGIA: Primary | ICD-10-CM

## 2024-03-28 RX ORDER — SCOLOPAMINE TRANSDERMAL SYSTEM 1 MG/1
1 PATCH, EXTENDED RELEASE TRANSDERMAL
Qty: 4 PATCH | Refills: 5 | Status: SHIPPED | OUTPATIENT
Start: 2024-03-28

## 2024-03-28 RX ORDER — GABAPENTIN 600 MG/1
600 TABLET ORAL 4 TIMES DAILY
Qty: 360 TABLET | Refills: 1 | Status: SHIPPED | OUTPATIENT
Start: 2024-03-28 | End: 2024-09-24

## 2024-03-28 RX ORDER — QUETIAPINE FUMARATE 150 MG/1
150 TABLET, FILM COATED ORAL
Qty: 90 TABLET | Refills: 1 | Status: SHIPPED | OUTPATIENT
Start: 2024-03-28

## 2024-03-28 SDOH — ECONOMIC STABILITY: FOOD INSECURITY: WITHIN THE PAST 12 MONTHS, YOU WORRIED THAT YOUR FOOD WOULD RUN OUT BEFORE YOU GOT MONEY TO BUY MORE.: NEVER TRUE

## 2024-03-28 SDOH — ECONOMIC STABILITY: FOOD INSECURITY: WITHIN THE PAST 12 MONTHS, THE FOOD YOU BOUGHT JUST DIDN'T LAST AND YOU DIDN'T HAVE MONEY TO GET MORE.: NEVER TRUE

## 2024-03-28 SDOH — ECONOMIC STABILITY: INCOME INSECURITY: HOW HARD IS IT FOR YOU TO PAY FOR THE VERY BASICS LIKE FOOD, HOUSING, MEDICAL CARE, AND HEATING?: NOT HARD AT ALL

## 2024-03-28 ASSESSMENT — PATIENT HEALTH QUESTIONNAIRE - PHQ9
2. FEELING DOWN, DEPRESSED OR HOPELESS: NOT AT ALL
SUM OF ALL RESPONSES TO PHQ QUESTIONS 1-9: 0
5. POOR APPETITE OR OVEREATING: NOT AT ALL
4. FEELING TIRED OR HAVING LITTLE ENERGY: NOT AT ALL
7. TROUBLE CONCENTRATING ON THINGS, SUCH AS READING THE NEWSPAPER OR WATCHING TELEVISION: NOT AT ALL
10. IF YOU CHECKED OFF ANY PROBLEMS, HOW DIFFICULT HAVE THESE PROBLEMS MADE IT FOR YOU TO DO YOUR WORK, TAKE CARE OF THINGS AT HOME, OR GET ALONG WITH OTHER PEOPLE: NOT DIFFICULT AT ALL
3. TROUBLE FALLING OR STAYING ASLEEP: NOT AT ALL
8. MOVING OR SPEAKING SO SLOWLY THAT OTHER PEOPLE COULD HAVE NOTICED. OR THE OPPOSITE, BEING SO FIGETY OR RESTLESS THAT YOU HAVE BEEN MOVING AROUND A LOT MORE THAN USUAL: NOT AT ALL
1. LITTLE INTEREST OR PLEASURE IN DOING THINGS: NOT AT ALL
9. THOUGHTS THAT YOU WOULD BE BETTER OFF DEAD, OR OF HURTING YOURSELF: NOT AT ALL
SUM OF ALL RESPONSES TO PHQ9 QUESTIONS 1 & 2: 0
6. FEELING BAD ABOUT YOURSELF - OR THAT YOU ARE A FAILURE OR HAVE LET YOURSELF OR YOUR FAMILY DOWN: NOT AT ALL
SUM OF ALL RESPONSES TO PHQ QUESTIONS 1-9: 0

## 2024-03-28 ASSESSMENT — ANXIETY QUESTIONNAIRES
7. FEELING AFRAID AS IF SOMETHING AWFUL MIGHT HAPPEN: NOT AT ALL
4. TROUBLE RELAXING: NOT AT ALL
3. WORRYING TOO MUCH ABOUT DIFFERENT THINGS: NOT AT ALL
GAD7 TOTAL SCORE: 0
6. BECOMING EASILY ANNOYED OR IRRITABLE: NOT AT ALL
5. BEING SO RESTLESS THAT IT IS HARD TO SIT STILL: NOT AT ALL
1. FEELING NERVOUS, ANXIOUS, OR ON EDGE: NOT AT ALL
IF YOU CHECKED OFF ANY PROBLEMS ON THIS QUESTIONNAIRE, HOW DIFFICULT HAVE THESE PROBLEMS MADE IT FOR YOU TO DO YOUR WORK, TAKE CARE OF THINGS AT HOME, OR GET ALONG WITH OTHER PEOPLE: NOT DIFFICULT AT ALL
2. NOT BEING ABLE TO STOP OR CONTROL WORRYING: NOT AT ALL

## 2024-03-28 ASSESSMENT — ENCOUNTER SYMPTOMS: SHORTNESS OF BREATH: 0

## 2024-03-28 NOTE — PROGRESS NOTES
[facility]  Yuriy Hollis M.D.  Internal Medicine  [address] [place] [address]  Office : [phone]  Fax : [phone]    Chief Complaint   Patient presents with    Migraine       History of Present Illness:  Tanya Bailey is a 63 y.o. female.  HPI    Migraine  Continues to have migraine with nausea so she wants to try Imitrex again as the nausea is clearly due to her migraines    Mood Disorder  Seroquel is helping greatly but she wants to increase the dose a little      Motion Sickness  Has an upcoming trip to [place] and would like transderm - scop    Past Medical History:  Past Medical History:   Diagnosis Date    Adult residual type attention deficit hyperactivity disorder (ADHD) 11/13/2020    Backache 12/16/2014    Bipolar 1 disorder, manic, mild ([facility]) 6/15/2021    Bipolar affective disorder ([facility]) 1/30/2014    Blindness left eye category 4, normal vision right eye 3/4/2022    Cerebrovascular disease 7/12/2023    Cigarette nicotine dependence without complication 3/4/2022    Class 2 severe obesity due to excess calories with serious comorbidity and body mass index (BMI) of 36.0 to 36.9 in adult (HCC) 4/21/2023    Confusion and disorientation 6/1/2017    Debility 6/13/2021    Elevated LDL cholesterol level 3/4/2022    History of cerebrovascular accident 4/21/2023    Mitral regurgitation 4/22/2023    Neuropathy 9/15/2020    Primary insomnia 9/15/2020    Pulmonary nodules 4/21/2023    Needs follow up CT 4/2024    Right lower lobe pneumonia 11/28/2021    Schizophrenia ([facility]) 1/30/2014    Stroke-like symptoms 4/21/2023    Thyroid nodule 4/21/2023    Bilateral thyroid nodules. Recommend biopsy of lesion #2 in the right thyroid lobe and the left thyroid nodule.    Tobacco use disorder 9/15/2020     Past Surgical History:  Past Surgical History:   Procedure Laterality Date    APPENDECTOMY       Allergies:   Allergies   Allergen Reactions    Hydroxyzine Pamoate

## 2024-03-29 ENCOUNTER — PATIENT MESSAGE (OUTPATIENT)
Dept: INTERNAL MEDICINE CLINIC | Facility: CLINIC | Age: 64
End: 2024-03-29

## 2024-04-01 RX ORDER — ONDANSETRON 4 MG/1
4 TABLET, ORALLY DISINTEGRATING ORAL 3 TIMES DAILY PRN
Qty: 21 TABLET | Refills: 0 | Status: SHIPPED | OUTPATIENT
Start: 2024-04-01

## 2024-04-01 NOTE — TELEPHONE ENCOUNTER
Martha Salinas MA 3/29/2024 11:50 AM EDT      ----- Message -----  From: Tanya Bailey \"Chaya\"  Sent: 3/29/2024 7:04 AM EDT  To: [facility] Clinical Staff  Subject: PLEZ HELP     Sorry, I need something fr nausea. Not sure my first message conveyed my request! Chaya Garcia

## 2024-04-10 ENCOUNTER — TELEPHONE (OUTPATIENT)
Dept: INTERNAL MEDICINE CLINIC | Facility: CLINIC | Age: 64
End: 2024-04-10

## 2024-04-10 NOTE — TELEPHONE ENCOUNTER
Patient called, she fell  over this past weekend, has a big open wound on her leg.  Chaya went to urgent care instead of the ER, wound was left open to drain and was told to follow up with her PCP. Urgent care was surprised that she could walk.T he open wound looks somewhat better, but is still very painful. Drainage has slowed some,but looks like it is infected.  Patient advised to go to the ER for evaluation /treatment.

## 2024-04-29 DIAGNOSIS — M79.7 FIBROMYALGIA: ICD-10-CM

## 2024-04-29 NOTE — TELEPHONE ENCOUNTER
Patient called to ask the office to please fill out the fax form that should be coming from her new pharmacy. Tanya has a problem getting to the pharmacy, her new pharmacy will deliver her medications to her.

## 2024-05-01 RX ORDER — GABAPENTIN 600 MG/1
600 TABLET ORAL 4 TIMES DAILY
Qty: 360 TABLET | Refills: 1 | Status: SHIPPED | OUTPATIENT
Start: 2024-05-01 | End: 2024-10-28

## 2024-05-01 RX ORDER — GABAPENTIN 800 MG/1
800 TABLET ORAL 3 TIMES DAILY
Qty: 270 TABLET | Refills: 1 | OUTPATIENT
Start: 2024-05-01 | End: 2024-07-30

## 2024-05-02 ENCOUNTER — APPOINTMENT (OUTPATIENT)
Dept: MRI IMAGING | Age: 64
End: 2024-05-02
Payer: MEDICARE

## 2024-05-02 ENCOUNTER — HOSPITAL ENCOUNTER (OUTPATIENT)
Age: 64
Setting detail: OBSERVATION
Discharge: HOME | End: 2024-05-03
Attending: EMERGENCY MEDICINE | Admitting: FAMILY MEDICINE
Payer: MEDICARE

## 2024-05-02 ENCOUNTER — APPOINTMENT (OUTPATIENT)
Dept: CT IMAGING | Age: 64
End: 2024-05-02
Payer: MEDICARE

## 2024-05-02 ENCOUNTER — TELEPHONE (OUTPATIENT)
Dept: INTERNAL MEDICINE CLINIC | Facility: CLINIC | Age: 64
End: 2024-05-02

## 2024-05-02 ENCOUNTER — APPOINTMENT (OUTPATIENT)
Dept: GENERAL RADIOLOGY | Age: 64
End: 2024-05-02
Payer: MEDICARE

## 2024-05-02 DIAGNOSIS — R27.0 ATAXIA: Primary | ICD-10-CM

## 2024-05-02 DIAGNOSIS — R53.1 GENERALIZED WEAKNESS: ICD-10-CM

## 2024-05-02 PROBLEM — R35.0 URINARY FREQUENCY: Status: ACTIVE | Noted: 2024-05-02

## 2024-05-02 PROBLEM — Z86.73 H/O ARTERIAL ISCHEMIC STROKE: Status: ACTIVE | Noted: 2023-07-19

## 2024-05-02 PROBLEM — H54.7 VISION LOSS: Status: ACTIVE | Noted: 2022-06-01

## 2024-05-02 PROBLEM — H81.10 BENIGN PAROXYSMAL VERTIGO, UNSPECIFIED EAR: Status: ACTIVE | Noted: 2023-04-27

## 2024-05-02 PROBLEM — R42 DIZZINESS: Status: ACTIVE | Noted: 2024-05-02

## 2024-05-02 PROBLEM — F32.2 CURRENT SEVERE EPISODE OF MAJOR DEPRESSIVE DISORDER WITHOUT PSYCHOTIC FEATURES WITHOUT PRIOR EPISODE (HCC): Status: ACTIVE | Noted: 2023-07-19

## 2024-05-02 PROBLEM — R26.81 UNSTEADINESS ON FEET: Status: ACTIVE | Noted: 2023-04-27

## 2024-05-02 PROBLEM — R26.0 ATAXIC GAIT: Status: ACTIVE | Noted: 2023-04-27

## 2024-05-02 PROBLEM — M79.7 FIBROMYALGIA: Status: ACTIVE | Noted: 2023-09-25

## 2024-05-02 PROBLEM — M62.81 MUSCLE WEAKNESS (GENERALIZED): Status: ACTIVE | Noted: 2023-04-27

## 2024-05-02 PROBLEM — F15.21 AMPHETAMINE USE DISORDER, SEVERE, IN SUSTAINED REMISSION (HCC): Status: ACTIVE | Noted: 2023-07-19

## 2024-05-02 PROBLEM — F14.21 COCAINE USE DISORDER, MODERATE, IN SUSTAINED REMISSION (HCC): Status: ACTIVE | Noted: 2023-07-19

## 2024-05-02 PROBLEM — E78.5 DYSLIPIDEMIA: Status: ACTIVE | Noted: 2023-07-19

## 2024-05-02 PROBLEM — D64.9 ANEMIA: Status: ACTIVE | Noted: 2024-05-02

## 2024-05-02 LAB
ALBUMIN SERPL-MCNC: 3.6 G/DL (ref 3.2–4.6)
ALBUMIN/GLOB SERPL: 1.2 (ref 1–1.9)
ALP SERPL-CCNC: 70 U/L (ref 35–104)
ALT SERPL-CCNC: <5 U/L (ref 12–65)
AMMONIA PLAS-SCNC: 21 UMOL/L (ref 11–51)
ANION GAP SERPL CALC-SCNC: 16 MMOL/L (ref 9–18)
AST SERPL-CCNC: 16 U/L (ref 15–37)
BASOPHILS # BLD: 0.1 K/UL (ref 0–0.2)
BASOPHILS NFR BLD: 1 % (ref 0–2)
BILIRUB SERPL-MCNC: <0.2 MG/DL (ref 0–1.2)
BUN SERPL-MCNC: 11 MG/DL (ref 8–23)
CALCIUM SERPL-MCNC: 8.9 MG/DL (ref 8.8–10.2)
CHLORIDE SERPL-SCNC: 104 MMOL/L (ref 98–107)
CO2 SERPL-SCNC: 20 MMOL/L (ref 20–28)
CREAT SERPL-MCNC: 0.94 MG/DL (ref 0.6–1.1)
DIFFERENTIAL METHOD BLD: ABNORMAL
EKG ATRIAL RATE: 82 BPM
EKG DIAGNOSIS: NORMAL
EKG P AXIS: 80 DEGREES
EKG P-R INTERVAL: 129 MS
EKG Q-T INTERVAL: 364 MS
EKG QRS DURATION: 72 MS
EKG QTC CALCULATION (BAZETT): 426 MS
EKG R AXIS: 78 DEGREES
EKG T AXIS: 50 DEGREES
EKG VENTRICULAR RATE: 82 BPM
EOSINOPHIL # BLD: 0.2 K/UL (ref 0–0.8)
EOSINOPHIL NFR BLD: 2 % (ref 0.5–7.8)
ERYTHROCYTE [DISTWIDTH] IN BLOOD BY AUTOMATED COUNT: 13.8 % (ref 11.9–14.6)
EST. AVERAGE GLUCOSE BLD GHB EST-MCNC: 124 MG/DL
FOLATE SERPL-MCNC: 19.9 NG/ML (ref 3.1–17.5)
GLOBULIN SER CALC-MCNC: 3.1 G/DL (ref 2.3–3.5)
GLUCOSE BLD STRIP.AUTO-MCNC: 138 MG/DL (ref 65–100)
GLUCOSE SERPL-MCNC: 138 MG/DL (ref 70–99)
HBA1C MFR BLD: 5.9 % (ref 0–5.6)
HCT VFR BLD AUTO: 37.9 % (ref 35.8–46.3)
HGB BLD-MCNC: 11.4 G/DL (ref 11.7–15.4)
IMM GRANULOCYTES # BLD AUTO: 0 K/UL (ref 0–0.5)
IMM GRANULOCYTES NFR BLD AUTO: 0 % (ref 0–5)
INR BLD: 1.1 (ref 0.9–1.2)
INR PPP: 0.9
LYMPHOCYTES # BLD: 3 K/UL (ref 0.5–4.6)
LYMPHOCYTES NFR BLD: 38 % (ref 13–44)
MAGNESIUM SERPL-MCNC: 2 MG/DL (ref 1.8–2.4)
MCH RBC QN AUTO: 26.8 PG (ref 26.1–32.9)
MCHC RBC AUTO-ENTMCNC: 30.1 G/DL (ref 31.4–35)
MCV RBC AUTO: 89 FL (ref 82–102)
MONOCYTES # BLD: 0.4 K/UL (ref 0.1–1.3)
MONOCYTES NFR BLD: 4 % (ref 4–12)
NEUTS SEG # BLD: 4.5 K/UL (ref 1.7–8.2)
NEUTS SEG NFR BLD: 55 % (ref 43–78)
NRBC # BLD: 0 K/UL (ref 0–0.2)
PLATELET # BLD AUTO: 214 K/UL (ref 150–450)
PMV BLD AUTO: 12.2 FL (ref 9.4–12.3)
POTASSIUM SERPL-SCNC: 3.8 MMOL/L (ref 3.5–5.1)
PROT SERPL-MCNC: 6.7 G/DL (ref 6.3–8.2)
PROTHROMBIN TIME: 12.2 SEC (ref 11.3–14.9)
PT BLD: 12.7 SECS (ref 9.6–11.6)
RBC # BLD AUTO: 4.26 M/UL (ref 4.05–5.2)
SERVICE CMNT-IMP: ABNORMAL
SODIUM SERPL-SCNC: 140 MMOL/L (ref 136–145)
TSH W FREE THYROID IF ABNORMAL: 0.58 UIU/ML (ref 0.27–4.2)
VIT B12 SERPL-MCNC: 521 PG/ML (ref 193–986)
WBC # BLD AUTO: 8.1 K/UL (ref 4.3–11.1)

## 2024-05-02 PROCEDURE — A4216 STERILE WATER/SALINE, 10 ML: HCPCS | Performed by: FAMILY MEDICINE

## 2024-05-02 PROCEDURE — 99285 EMERGENCY DEPT VISIT HI MDM: CPT

## 2024-05-02 PROCEDURE — 86780 TREPONEMA PALLIDUM: CPT

## 2024-05-02 PROCEDURE — 70496 CT ANGIOGRAPHY HEAD: CPT | Performed by: RADIOLOGY

## 2024-05-02 PROCEDURE — G0378 HOSPITAL OBSERVATION PER HR: HCPCS

## 2024-05-02 PROCEDURE — 82962 GLUCOSE BLOOD TEST: CPT

## 2024-05-02 PROCEDURE — 96372 THER/PROPH/DIAG INJ SC/IM: CPT

## 2024-05-02 PROCEDURE — 85025 COMPLETE CBC W/AUTO DIFF WBC: CPT

## 2024-05-02 PROCEDURE — 2580000003 HC RX 258: Performed by: FAMILY MEDICINE

## 2024-05-02 PROCEDURE — 70551 MRI BRAIN STEM W/O DYE: CPT

## 2024-05-02 PROCEDURE — 70450 CT HEAD/BRAIN W/O DYE: CPT

## 2024-05-02 PROCEDURE — 70496 CT ANGIOGRAPHY HEAD: CPT

## 2024-05-02 PROCEDURE — 6360000002 HC RX W HCPCS: Performed by: FAMILY MEDICINE

## 2024-05-02 PROCEDURE — 82607 VITAMIN B-12: CPT

## 2024-05-02 PROCEDURE — 82746 ASSAY OF FOLIC ACID SERUM: CPT

## 2024-05-02 PROCEDURE — 93010 ELECTROCARDIOGRAM REPORT: CPT | Performed by: INTERNAL MEDICINE

## 2024-05-02 PROCEDURE — 71045 X-RAY EXAM CHEST 1 VIEW: CPT

## 2024-05-02 PROCEDURE — 83735 ASSAY OF MAGNESIUM: CPT

## 2024-05-02 PROCEDURE — 6360000004 HC RX CONTRAST MEDICATION: Performed by: EMERGENCY MEDICINE

## 2024-05-02 PROCEDURE — 80053 COMPREHEN METABOLIC PANEL: CPT

## 2024-05-02 PROCEDURE — 82140 ASSAY OF AMMONIA: CPT

## 2024-05-02 PROCEDURE — 6370000000 HC RX 637 (ALT 250 FOR IP): Performed by: EMERGENCY MEDICINE

## 2024-05-02 PROCEDURE — 85610 PROTHROMBIN TIME: CPT

## 2024-05-02 PROCEDURE — 70498 CT ANGIOGRAPHY NECK: CPT | Performed by: RADIOLOGY

## 2024-05-02 PROCEDURE — 6370000000 HC RX 637 (ALT 250 FOR IP): Performed by: FAMILY MEDICINE

## 2024-05-02 PROCEDURE — 83036 HEMOGLOBIN GLYCOSYLATED A1C: CPT

## 2024-05-02 PROCEDURE — 94760 N-INVAS EAR/PLS OXIMETRY 1: CPT

## 2024-05-02 PROCEDURE — 96374 THER/PROPH/DIAG INJ IV PUSH: CPT

## 2024-05-02 PROCEDURE — 93005 ELECTROCARDIOGRAM TRACING: CPT | Performed by: EMERGENCY MEDICINE

## 2024-05-02 PROCEDURE — 84443 ASSAY THYROID STIM HORMONE: CPT

## 2024-05-02 RX ORDER — LANOLIN ALCOHOL/MO/W.PET/CERES
3 CREAM (GRAM) TOPICAL NIGHTLY PRN
Status: DISCONTINUED | OUTPATIENT
Start: 2024-05-02 | End: 2024-05-03 | Stop reason: HOSPADM

## 2024-05-02 RX ORDER — QUETIAPINE FUMARATE 100 MG/1
150 TABLET, FILM COATED ORAL
Status: DISCONTINUED | OUTPATIENT
Start: 2024-05-02 | End: 2024-05-03 | Stop reason: HOSPADM

## 2024-05-02 RX ORDER — SODIUM CHLORIDE 9 MG/ML
INJECTION, SOLUTION INTRAVENOUS PRN
Status: DISCONTINUED | OUTPATIENT
Start: 2024-05-02 | End: 2024-05-03 | Stop reason: HOSPADM

## 2024-05-02 RX ORDER — TRAMADOL HYDROCHLORIDE 50 MG/1
50 TABLET ORAL
Status: COMPLETED | OUTPATIENT
Start: 2024-05-02 | End: 2024-05-02

## 2024-05-02 RX ORDER — ATORVASTATIN CALCIUM 40 MG/1
80 TABLET, FILM COATED ORAL NIGHTLY
Status: DISCONTINUED | OUTPATIENT
Start: 2024-05-02 | End: 2024-05-03 | Stop reason: HOSPADM

## 2024-05-02 RX ORDER — TRAMADOL HYDROCHLORIDE 50 MG/1
50 TABLET ORAL EVERY 8 HOURS PRN
Status: DISCONTINUED | OUTPATIENT
Start: 2024-05-02 | End: 2024-05-03 | Stop reason: HOSPADM

## 2024-05-02 RX ORDER — SODIUM CHLORIDE 0.9 % (FLUSH) 0.9 %
5-40 SYRINGE (ML) INJECTION EVERY 12 HOURS SCHEDULED
Status: DISCONTINUED | OUTPATIENT
Start: 2024-05-02 | End: 2024-05-03 | Stop reason: HOSPADM

## 2024-05-02 RX ORDER — TRAMADOL HYDROCHLORIDE 50 MG/1
25 TABLET ORAL EVERY 8 HOURS PRN
Status: DISCONTINUED | OUTPATIENT
Start: 2024-05-02 | End: 2024-05-03 | Stop reason: HOSPADM

## 2024-05-02 RX ORDER — ONDANSETRON 4 MG/1
4 TABLET, ORALLY DISINTEGRATING ORAL EVERY 8 HOURS PRN
Status: DISCONTINUED | OUTPATIENT
Start: 2024-05-02 | End: 2024-05-03 | Stop reason: HOSPADM

## 2024-05-02 RX ORDER — POLYETHYLENE GLYCOL 3350 17 G/17G
17 POWDER, FOR SOLUTION ORAL DAILY PRN
Status: DISCONTINUED | OUTPATIENT
Start: 2024-05-02 | End: 2024-05-03 | Stop reason: HOSPADM

## 2024-05-02 RX ORDER — SODIUM CHLORIDE 0.9 % (FLUSH) 0.9 %
5-40 SYRINGE (ML) INJECTION PRN
Status: DISCONTINUED | OUTPATIENT
Start: 2024-05-02 | End: 2024-05-03 | Stop reason: HOSPADM

## 2024-05-02 RX ORDER — ASPIRIN 300 MG/1
300 SUPPOSITORY RECTAL DAILY
Status: DISCONTINUED | OUTPATIENT
Start: 2024-05-03 | End: 2024-05-03 | Stop reason: HOSPADM

## 2024-05-02 RX ORDER — ONDANSETRON 2 MG/ML
4 INJECTION INTRAMUSCULAR; INTRAVENOUS EVERY 6 HOURS PRN
Status: DISCONTINUED | OUTPATIENT
Start: 2024-05-02 | End: 2024-05-03 | Stop reason: HOSPADM

## 2024-05-02 RX ORDER — ASPIRIN 325 MG
325 TABLET ORAL
Status: COMPLETED | OUTPATIENT
Start: 2024-05-02 | End: 2024-05-02

## 2024-05-02 RX ORDER — ENOXAPARIN SODIUM 100 MG/ML
30 INJECTION SUBCUTANEOUS 2 TIMES DAILY
Status: DISCONTINUED | OUTPATIENT
Start: 2024-05-02 | End: 2024-05-03 | Stop reason: HOSPADM

## 2024-05-02 RX ORDER — ASPIRIN 81 MG/1
81 TABLET, CHEWABLE ORAL DAILY
Status: DISCONTINUED | OUTPATIENT
Start: 2024-05-03 | End: 2024-05-03 | Stop reason: HOSPADM

## 2024-05-02 RX ORDER — GABAPENTIN 300 MG/1
600 CAPSULE ORAL 4 TIMES DAILY
Status: DISCONTINUED | OUTPATIENT
Start: 2024-05-02 | End: 2024-05-03 | Stop reason: HOSPADM

## 2024-05-02 RX ADMIN — ATORVASTATIN CALCIUM 80 MG: 40 TABLET, FILM COATED ORAL at 22:02

## 2024-05-02 RX ADMIN — TRAMADOL HYDROCHLORIDE 50 MG: 50 TABLET ORAL at 14:18

## 2024-05-02 RX ADMIN — GABAPENTIN 600 MG: 300 CAPSULE ORAL at 17:13

## 2024-05-02 RX ADMIN — ENOXAPARIN SODIUM 30 MG: 100 INJECTION SUBCUTANEOUS at 22:03

## 2024-05-02 RX ADMIN — GABAPENTIN 600 MG: 300 CAPSULE ORAL at 22:01

## 2024-05-02 RX ADMIN — QUETIAPINE FUMARATE 150 MG: 100 TABLET ORAL at 22:01

## 2024-05-02 RX ADMIN — SODIUM CHLORIDE 1 MG: 9 INJECTION INTRAMUSCULAR; INTRAVENOUS; SUBCUTANEOUS at 17:16

## 2024-05-02 RX ADMIN — ASPIRIN 325 MG: 325 TABLET, FILM COATED ORAL at 15:29

## 2024-05-02 RX ADMIN — IOPAMIDOL 60 ML: 755 INJECTION, SOLUTION INTRAVENOUS at 13:42

## 2024-05-02 RX ADMIN — SODIUM CHLORIDE, PRESERVATIVE FREE 10 ML: 5 INJECTION INTRAVENOUS at 22:03

## 2024-05-02 ASSESSMENT — LIFESTYLE VARIABLES
HOW OFTEN DO YOU HAVE A DRINK CONTAINING ALCOHOL: NEVER
HOW MANY STANDARD DRINKS CONTAINING ALCOHOL DO YOU HAVE ON A TYPICAL DAY: PATIENT DOES NOT DRINK

## 2024-05-02 ASSESSMENT — PAIN SCALES - GENERAL: PAINLEVEL_OUTOF10: 8

## 2024-05-02 ASSESSMENT — PAIN DESCRIPTION - LOCATION: LOCATION: BACK

## 2024-05-02 ASSESSMENT — PAIN - FUNCTIONAL ASSESSMENT: PAIN_FUNCTIONAL_ASSESSMENT: NONE - DENIES PAIN

## 2024-05-02 NOTE — H&P
Disequilibrium  Assessment  Reported difficulties with balance, however entirely normal neurologic exam otherwise though gait examination was deferred due to obesity and fall risk.   With history of CVA need to exclude cerebellar stroke though current symptoms, normal coordination testing and otherwise neurologic exam not clearly representative of CVA/TIA precluding further stroke workup and DAPT until MRI  Plan  not candidate for tPa or intervention as outside treatment windows for both, and not clearly CVA/TIA  Bedside swallow  PT, OT  Atorvastatin 80 mg daily   ASA 81 mg daily, if MRI reveals CVA needs DAPT x21 days   Maintain -180, outside permissive HTN window  Continuous telemetry, monitor for A fib   Labs/imaging:  CT head without contrast & CTA head and neck without acute findings  MRI brain  UDS, TSH, b12/folate, RPR, and HIV  Further workup pending results of MRI brain    Urinary frequency  Add UA    Other active/chronic issues:  Primary insomnia, Bipolar disorder, scizhophrenia - stable here. Continue home Seroquel.  Hx of amphetamine abuse & cocaine abuse - UDS. Reportedly abstinent  Neuropathy - stable. Continue home gabapentin 600 mg TID. Additional neuropathy workup as above.  Lung & thyroid nodules - needs outpatient follow up  Anemia - no bleeding on ROS or exam. Monitor for bleeding. Anemia workup as above. Otherwise outpatient follow up and age appropriate CA screening.    PT/OT evals and PPD ordered?  Therapy and PPD  Diet: Diet NPO  VTE prophylaxis: Lovenox  Code status: Full Code      Non-peripheral Lines and Tubes (if present):             Hospital Problems:  Principal Problem:    Dizziness  Active Problems:    Primary insomnia    Class 2 severe obesity due to excess calories with serious comorbidity and body mass index (BMI) of 36.0 to 36.9 in adult (HCC)    Pulmonary nodules    Thyroid nodule    Schizophrenia (HCC)    Neuropathy    Blindness left eye category 4, normal vision  426 ms    P Axis 80 degrees    R Axis 78 degrees    T Axis 50 degrees    Diagnosis       Sinus rhythm  Atrial premature complex  Left atrial enlargement  RSR' in V1 or V2, probably normal variant    Confirmed by MD LIONEL (), FAMILIA ([id number]) on 5/2/2024 1:54:06 PM     POCT Glucose    Collection Time: 05/02/24  1:26 PM   Result Value Ref Range    POC Glucose 138 (H) 65 - 100 mg/dL    Performed by: Isaac (Amanda)    POCT INR    Collection Time: 05/02/24  1:28 PM   Result Value Ref Range    POC Protime 12.7 (H) 9.6 - 11.6 SECS    POC INR 1.1 0.9 - 1.2     CBC with Auto Differential    Collection Time: 05/02/24  1:29 PM   Result Value Ref Range    WBC 8.1 4.3 - 11.1 K/uL    RBC 4.26 4.05 - 5.2 M/uL    Hemoglobin 11.4 (L) 11.7 - 15.4 g/dL    Hematocrit 37.9 35.8 - 46.3 %    MCV 89.0 82.0 - 102.0 FL    MCH 26.8 26.1 - 32.9 PG    MCHC 30.1 (L) 31.4 - 35.0 g/dL    RDW 13.8 11.9 - 14.6 %    Platelets 214 150 - 450 K/uL    MPV 12.2 9.4 - 12.3 FL    nRBC 0.00 0.0 - 0.2 K/uL    Differential Type AUTOMATED      Neutrophils % 55 43 - 78 %    Lymphocytes % 38 13 - 44 %    Monocytes % 4 4.0 - 12.0 %    Eosinophils % 2 0.5 - 7.8 %    Basophils % 1 0.0 - 2.0 %    Immature Granulocytes % 0 0.0 - 5.0 %    Neutrophils Absolute 4.5 1.7 - 8.2 K/UL    Lymphocytes Absolute 3.0 0.5 - 4.6 K/UL    Monocytes Absolute 0.4 0.1 - 1.3 K/UL    Eosinophils Absolute 0.2 0.0 - 0.8 K/UL    Basophils Absolute 0.1 0.0 - 0.2 K/UL    Immature Granulocytes Absolute 0.0 0.0 - 0.5 K/UL   Comprehensive Metabolic Panel    Collection Time: 05/02/24  1:29 PM   Result Value Ref Range    Sodium 140 136 - 145 mmol/L    Potassium 3.8 3.5 - 5.1 mmol/L    Chloride 104 98 - 107 mmol/L    CO2 20 20 - 28 mmol/L    Anion Gap 16 9 - 18 mmol/L    Glucose 138 (H) 70 - 99 mg/dL    BUN 11 8 - 23 MG/DL    Creatinine 0.94 0.60 - 1.10 MG/DL    Est, Glom Filt Rate 68 >60 ml/min/1.73m2    Calcium 8.9 8.8 - 10.2 MG/DL    Total Bilirubin <0.2 0.0 - 1.2 MG/DL    ALT <5 (L)

## 2024-05-02 NOTE — PROGRESS NOTES
PHYSICAL THERAPY    I have attempted to see pt twice for her PT Caden. First attempt with RN for MRI screen and then 2nd attempt with Dr Trujillo.    Lynette Rodrigez, PT

## 2024-05-02 NOTE — ED TRIAGE NOTES
PT ambulatory to triage with c/o dizziness, palpitations and lethargic.     PT reports feeling like her legs are heavy and she is having trouble walking.     PT is unsteady as she is walking to room.     HX- stroke x2    PT reports unbalance x2 weeks.

## 2024-05-02 NOTE — ED PROVIDER NOTES
Emergency Department Provider Note                   PCP:                Yuriy Hollis MD               Age: 63 y.o.      Sex: female       ICD-10-CM    1. Ataxia  R27.0       2. Generalized weakness  R53.1           DISPOSITION Admitted 05/02/2024 03:00:02 PM        MDM  Number of Diagnoses or Management Options  Ataxia  Generalized weakness  Diagnosis management comments: MEDICAL DECISION MAKING  Complexity of Problems Addressed:  1 or more acute illnesses that pose a threat to life or bodily function.     Data Reviewed and Analyzed:  Category 1:   I independently ordered and reviewed each unique test.    Category 2:   I independently ordered and interpreted the ED EKG in the absence of a Cardiologist.    Rate: 82  EKG Interpretation: EKG Interpretation: sinus rhythm, no evidence of arrhythmia  ST Segments: Normal ST segments - NO STEMI  I interpreted the CT Scan No acute bleeding.    Category 3: Discussion of management or test interpretation.  The patient presents feeling unbalanced and weak. On exam, she was noted to have ataxia but symptoms have been present for the past 2 days. Vital signs stable. Blood work unrevealing. CT head showed no acute findings and CTA is also unremarkable. Given her symptoms, consulted hospitalist for admission for further workup and evaluation for cerebellar cause.   The patient was admitted and I have discussed patient management with the admitting provider.    Risk of Complications and/or Morbidity of Patient Management:  Chronic medical problems impacting care include CVA in past.                Orders Placed This Encounter   Procedures    CT HEAD WO CONTRAST    CTA HEAD NECK W WO CONTRAST    CBC with Auto Differential    Comprehensive Metabolic Panel    Protime-INR    Magnesium    Ammonia    NIHSS    Nursing swallow assessment    Telemetry monitoring - 48 hour duration    Neuro checks    POCT INR    POCT Glucose    EKG 12 Lead    Place in Observation Service    Fall

## 2024-05-02 NOTE — ED NOTES
TRANSFER - OUT REPORT:    Verbal report given to NICHO Topete on Tanya Bailey  being transferred to [facility] for routine progression of patient care       Report consisted of patient's Situation, Background, Assessment and   Recommendations(SBAR).     Information from the following report(s) ED SBAR, MAR, Recent Results, Cardiac Rhythm Sinus Rhythm, and Neuro Assessment was reviewed with the receiving nurse.    Lines:   Peripheral IV 05/02/24 Right Antecubital (Active)        Opportunity for questions and clarification was provided.      Patient transported with:  Transport team

## 2024-05-02 NOTE — ED NOTES
Attempted to call report- accepting RN is in another room with PT, they informed this RN they will give me a call back.

## 2024-05-02 NOTE — TELEPHONE ENCOUNTER
Received call from [facility], patient was a triage call. Spoke to patient, she is very concerned that her symptoms of extreme dizziness, fatigue, extremity weakness and palpitations have gotten worse.  These are the same symptoms that she experiences just before having her stroke.  Patient was advised to go to the ER ASAP, she can be evaluated and testing done right there., if this is a prelude to a stroke time is of the essence. Patient is agreeable and will go to the ER.

## 2024-05-03 VITALS
BODY MASS INDEX: 35.4 KG/M2 | TEMPERATURE: 98 F | HEIGHT: 69 IN | DIASTOLIC BLOOD PRESSURE: 74 MMHG | SYSTOLIC BLOOD PRESSURE: 101 MMHG | OXYGEN SATURATION: 98 % | RESPIRATION RATE: 16 BRPM | HEART RATE: 62 BPM | WEIGHT: 239 LBS

## 2024-05-03 PROBLEM — N39.0 UTI (URINARY TRACT INFECTION): Status: ACTIVE | Noted: 2024-05-03

## 2024-05-03 LAB
ANION GAP SERPL CALC-SCNC: 11 MMOL/L (ref 9–18)
APPEARANCE UR: CLEAR
BACTERIA URNS QL MICRO: ABNORMAL /HPF
BILIRUB UR QL: NEGATIVE
BUN SERPL-MCNC: 14 MG/DL (ref 8–23)
CALCIUM SERPL-MCNC: 8.5 MG/DL (ref 8.8–10.2)
CASTS URNS QL MICRO: ABNORMAL /LPF
CHLORIDE SERPL-SCNC: 107 MMOL/L (ref 98–107)
CHOLEST SERPL-MCNC: 261 MG/DL (ref 0–200)
CO2 SERPL-SCNC: 24 MMOL/L (ref 20–28)
COLOR UR: ABNORMAL
CREAT SERPL-MCNC: 0.8 MG/DL (ref 0.6–1.1)
EPI CELLS #/AREA URNS HPF: ABNORMAL /HPF
ERYTHROCYTE [DISTWIDTH] IN BLOOD BY AUTOMATED COUNT: 13.7 % (ref 11.9–14.6)
GLUCOSE SERPL-MCNC: 99 MG/DL (ref 70–99)
GLUCOSE UR STRIP.AUTO-MCNC: NEGATIVE MG/DL
HCT VFR BLD AUTO: 34.6 % (ref 35.8–46.3)
HDLC SERPL-MCNC: 44 MG/DL (ref 40–60)
HDLC SERPL: 5.9 (ref 0–5)
HGB BLD-MCNC: 10.3 G/DL (ref 11.7–15.4)
HGB UR QL STRIP: NEGATIVE
HIV 1+2 AB+HIV1 P24 AG SERPL QL IA: NONREACTIVE
HIV 1/2 RESULT COMMENT: NORMAL
KETONES UR QL STRIP.AUTO: NEGATIVE MG/DL
LDLC SERPL CALC-MCNC: 164 MG/DL (ref 0–100)
LEUKOCYTE ESTERASE UR QL STRIP.AUTO: ABNORMAL
MCH RBC QN AUTO: 26.8 PG (ref 26.1–32.9)
MCHC RBC AUTO-ENTMCNC: 29.8 G/DL (ref 31.4–35)
MCV RBC AUTO: 90.1 FL (ref 82–102)
MUCOUS THREADS URNS QL MICRO: 0 /LPF
NITRITE UR QL STRIP.AUTO: NEGATIVE
NRBC # BLD: 0 K/UL (ref 0–0.2)
PH UR STRIP: 6 (ref 5–9)
PLATELET # BLD AUTO: 192 K/UL (ref 150–450)
PMV BLD AUTO: 12.7 FL (ref 9.4–12.3)
POTASSIUM SERPL-SCNC: 3.9 MMOL/L (ref 3.5–5.1)
PROT UR STRIP-MCNC: NEGATIVE MG/DL
RBC # BLD AUTO: 3.84 M/UL (ref 4.05–5.2)
RBC #/AREA URNS HPF: ABNORMAL /HPF
SODIUM SERPL-SCNC: 142 MMOL/L (ref 136–145)
SP GR UR REFRACTOMETRY: 1.03 (ref 1–1.02)
T PALLIDUM AB SER QL IA: NONREACTIVE
TRIGL SERPL-MCNC: 264 MG/DL (ref 0–150)
URINE CULTURE IF INDICATED: ABNORMAL
UROBILINOGEN UR QL STRIP.AUTO: 0.2 EU/DL (ref 0.2–1)
VLDLC SERPL CALC-MCNC: 53 MG/DL (ref 6–23)
WBC # BLD AUTO: 6.9 K/UL (ref 4.3–11.1)
WBC URNS QL MICRO: ABNORMAL /HPF

## 2024-05-03 PROCEDURE — 87186 SC STD MICRODIL/AGAR DIL: CPT

## 2024-05-03 PROCEDURE — 81001 URINALYSIS AUTO W/SCOPE: CPT

## 2024-05-03 PROCEDURE — 92523 SPEECH SOUND LANG COMPREHEN: CPT

## 2024-05-03 PROCEDURE — 2580000003 HC RX 258: Performed by: FAMILY MEDICINE

## 2024-05-03 PROCEDURE — 85027 COMPLETE CBC AUTOMATED: CPT

## 2024-05-03 PROCEDURE — 80061 LIPID PANEL: CPT

## 2024-05-03 PROCEDURE — G0378 HOSPITAL OBSERVATION PER HR: HCPCS

## 2024-05-03 PROCEDURE — 92610 EVALUATE SWALLOWING FUNCTION: CPT

## 2024-05-03 PROCEDURE — 87086 URINE CULTURE/COLONY COUNT: CPT

## 2024-05-03 PROCEDURE — 6370000000 HC RX 637 (ALT 250 FOR IP): Performed by: FAMILY MEDICINE

## 2024-05-03 PROCEDURE — 6360000002 HC RX W HCPCS: Performed by: FAMILY MEDICINE

## 2024-05-03 PROCEDURE — 97165 OT EVAL LOW COMPLEX 30 MIN: CPT

## 2024-05-03 PROCEDURE — 97161 PT EVAL LOW COMPLEX 20 MIN: CPT

## 2024-05-03 PROCEDURE — 36415 COLL VENOUS BLD VENIPUNCTURE: CPT

## 2024-05-03 PROCEDURE — 80048 BASIC METABOLIC PNL TOTAL CA: CPT

## 2024-05-03 PROCEDURE — 97535 SELF CARE MNGMENT TRAINING: CPT

## 2024-05-03 PROCEDURE — 97530 THERAPEUTIC ACTIVITIES: CPT

## 2024-05-03 PROCEDURE — 87389 HIV-1 AG W/HIV-1&-2 AB AG IA: CPT

## 2024-05-03 PROCEDURE — 96372 THER/PROPH/DIAG INJ SC/IM: CPT

## 2024-05-03 PROCEDURE — 87088 URINE BACTERIA CULTURE: CPT

## 2024-05-03 RX ORDER — CEPHALEXIN 500 MG/1
500 CAPSULE ORAL EVERY 12 HOURS SCHEDULED
Status: DISCONTINUED | OUTPATIENT
Start: 2024-05-03 | End: 2024-05-03 | Stop reason: HOSPADM

## 2024-05-03 RX ORDER — CEPHALEXIN 500 MG/1
500 CAPSULE ORAL EVERY 12 HOURS SCHEDULED
Qty: 10 CAPSULE | Refills: 0 | Status: SHIPPED | OUTPATIENT
Start: 2024-05-03 | End: 2024-05-05 | Stop reason: ALTCHOICE

## 2024-05-03 RX ORDER — ASPIRIN 81 MG/1
81 TABLET, CHEWABLE ORAL DAILY
Qty: 30 TABLET | Refills: 3 | Status: SHIPPED | OUTPATIENT
Start: 2024-05-04

## 2024-05-03 RX ORDER — ATORVASTATIN CALCIUM 80 MG/1
40 TABLET, FILM COATED ORAL NIGHTLY
Qty: 30 TABLET | Refills: 3 | Status: SHIPPED | OUTPATIENT
Start: 2024-05-03

## 2024-05-03 RX ADMIN — ENOXAPARIN SODIUM 30 MG: 100 INJECTION SUBCUTANEOUS at 08:26

## 2024-05-03 RX ADMIN — ASPIRIN 81 MG 81 MG: 81 TABLET ORAL at 08:25

## 2024-05-03 RX ADMIN — SODIUM CHLORIDE, PRESERVATIVE FREE 10 ML: 5 INJECTION INTRAVENOUS at 08:26

## 2024-05-03 RX ADMIN — GABAPENTIN 600 MG: 300 CAPSULE ORAL at 08:25

## 2024-05-03 NOTE — THERAPY EVALUATION
ACUTE PHYSICAL THERAPY GOALS:   (Developed with and agreed upon by patient and/or caregiver.)    (1.)Ms. Bailey will move from supine to sit and sit to supine  in bed with SUPERVISION within 7 treatment day(s).    (2.)Ms. Bailey will transfer from bed to chair and chair to bed with SUPERVISION using the least restrictive device within 7 treatment day(s).    (3.)Ms. Bailey will ambulate with SUPERVISION for 250 feet with the least restrictive device within 7 treatment day(s).  (4.)Ms. Bailey will ascend/descend 4 steps with 1 railing with least restrictive device within 7 treatment days.  ________________________________________________________________________________________________    PHYSICAL THERAPY Initial Assessment and AM  (Link to Caseload Tracking: PT Visit Days : 1  Acknowledge Orders  Time In/Out  PT Charge Capture  Rehab Caseload Tracker    Tanya Bailey is a 63 y.o. female   PRIMARY DIAGNOSIS: Dizziness  Dizziness [R42]  Ataxia [R27.0]  Generalized weakness [R53.1]       Reason for Referral: Generalized Muscle Weakness (M62.81)  Difficulty in walking, Not elsewhere classified (R26.2)  Other abnormalities of gait and mobility (R26.89)  Dizziness and Giddiness (R42)  Observation: Payor: [employer] MEDICARE / Plan: [employer] GOLD PLUS HMO / Product Type: *No Product type* /     ASSESSMENT:     REHAB RECOMMENDATIONS:   Recommendation to date pending progress:  Setting:  Home Health Therapy    Equipment:    None  Is going to borrow a walker from her friend     ASSESSMENT:  Ms. Bailey is a 63 year old female who presents with above diagnosis. This date pt performs mobility including bed mobility, transfers, and ambulation with use of rolling walker.  Pt reports increased dizziness with head movements, no nystagmus noted today.  Pt impulsive at times, reports she does not need a rolling walker but grabbing for therapist.  Pt educated need to use rolling walker to increase stability and decrease risk of falls, pt verbalized  understanding and agreed. Pt presents as functioning below her baseline, with deficits in mobility including transfers, gait, balance, and activity tolerance. Pt will benefit from skilled therapy services to address stated deficits to promote return to highest level of function, independence, and safety. Will continue to follow.      [facility]™ “6 Clicks” Basic Mobility Inpatient Short Form  AM-PAC Basic Mobility - Inpatient   How much help is needed turning from your back to your side while in a flat bed without using bedrails?: None  How much help is needed moving from lying on your back to sitting on the side of a flat bed without using bedrails?: None  How much help is needed moving to and from a bed to a chair?: A Little  How much help is needed standing up from a chair using your arms?: A Little  How much help is needed walking in hospital room?: A Little  How much help is needed climbing 3-5 steps with a railing?: A Little  [facility] Inpatient Mobility Raw Score : 20  AM-PAC Inpatient T-Scale Score : 47.67  Mobility Inpatient CMS 0-100% Score: 35.83  Mobility Inpatient CMS G-Code Modifier : CJ    SUBJECTIVE:   Ms. Bailey states, \"I'm dizzy\"     Social/Functional Lives With: Friend(s)  Type of Home: House  Bathroom Shower/Tub: None  Bathroom Equipment: None  Home Equipment: Walker, rolling  ADL Assistance: Independent  Ambulation Assistance: Independent  Mode of Transportation: Car    OBJECTIVE:     PAIN: VITALS / O2: PRECAUTION / LINES / DRAINS:   Pre Treatment:   Pain Assessment: None - Denies Pain      Post Treatment: Pt no c/o pain post Vitals        Oxygen      External Catheter and Telemetry     RESTRICTIONS/PRECAUTIONS:                    GROSS EVALUATION:  Intact Impaired (Comments):   AROM [x]     PROM [x]    Strength []  B LE grossly 4+/5   Balance [] Posture: Good  Sitting - Static: Good  Sitting - Dynamic: Good  Standing - Static: Fair, +  Standing - Dynamic: Fair (with use of rolling

## 2024-05-03 NOTE — CARE COORDINATION
Discharge planning was discussed with the physical therapist and occupational therapist. Home health PT and OT have been recommended.    Discharge planning was discussed with the attending MD. The home health orders were confirmed with read back with the physician.

## 2024-05-03 NOTE — CARE COORDINATION
Discharge planning was discussed with the Interdisciplinary Team. The patient is a potential discharge today or tomorrow. She is pending a PT evaluation.

## 2024-05-03 NOTE — CARE COORDINATION
The physical therapist and occupational therapist are at the bedside. Case management will attempt to meet with the patient at a later time.

## 2024-05-03 NOTE — PROGRESS NOTES
ACUTE OCCUPATIONAL THERAPY GOALS:   (Developed with and agreed upon by patient and/or caregiver.)  1. Patient will perform lower body dressing with supervision.  2. Patient will perform upper and lower body bathing with supervision.  3. Patient will perform toilet transfers with supervision.  4. Patient will participate in 30 + minutes of ADL/ therapeutic exercise/therapeutic activity with min rest breaks to increase activity tolerance for self care.  5. Patient will perform standing grooming tasks x5 mins with supervision.  6. Patient will perform ADL functional mobility in room with supervision.    Goals to be achieved in 7 days.      OCCUPATIONAL THERAPY Initial Assessment and Daily Note       OT Visit Days: 1  Acknowledge Orders  Time  OT Charge Capture  Rehab Caseload Tracker      Tanya Bailey is a 63 y.o. female   PRIMARY DIAGNOSIS: Dizziness  Dizziness [R42]  Ataxia [R27.0]  Generalized weakness [R53.1]       Reason for Referral: Generalized Muscle Weakness (M62.81)  Other lack of cordination (R27.8)  Dizziness and Giddiness (R42)  Observation: Payor: [employer] MEDICARE / Plan: [employer] GOLD PLUS HMO / Product Type: *No Product type* /     ASSESSMENT:     REHAB RECOMMENDATIONS:   Recommendation to date pending progress:  Setting:  Home Health Therapy    Equipment:    None     ASSESSMENT:  Tanya Bailey is a 63 y.o. admitted for dizziness and stroke rule out. MRI/CT head negative.  Patient states she lives with her friend and reports prior level of function as independent with all ADLs, IADLs, and performs functional/community mobility without the use of an assistive device. Based on OT evaluation completed this date, her current level of function is stand by assist-min assist for ADL tasks and min assist for functional transfers/mobility. Patient presents with deficits in ADL performance, strength, balance and endurance. Patient will benefit from continued skilled OT services in this setting to address deficits  balance    Sensation []     Coordination []  Decreased      Tone []       Edema []    Activity Tolerance [] Limited due to fatigue       Hand Dominance R [] L []      COGNITION/  PERCEPTION: INTACT IMPAIRED   (See Comments)   Orientation [x]     Vision [x]     Hearing [x]     Cognition  []  Needed cues for safety, impulsive, needs redirection    Perception []       MOBILITY: I Mod I S SBA CGA Min Mod Max Total  NT x2 Comments:   Bed Mobility    Rolling [] [] [] [] [] [] [] [] [] [] []    Supine to Sit [] [] [] [x] [] [] [] [] [] [] []    Scooting [] [] [] [x] [] [] [] [] [] [] []    Sit to Supine [] [] [] [] [] [] [] [] [] [] []    Transfers    Sit to Stand [] [] [] [] [] [x] [] [] [] [] []    Bed to Chair [] [] [] [] [] [x] [] [] [] [] []    Stand to Sit [] [] [] [] [] [x] [] [] [] [] []    Tub/Shower [] [] [] [] [] [] [] [] [] [] []     Toilet [] [] [] [] [] [] [] [] [] [] []      [] [] [] [] [] [] [] [] [] [] []    I=Independent, Mod I=Modified Independent, S=Supervision/Setup, SBA=Standby Assistance, CGA=Contact Guard Assistance, Min=Minimal Assistance, Mod=Moderate Assistance, Max=Maximal Assistance, Total=Total Assistance, NT=Not Tested    ACTIVITIES OF DAILY LIVING: I Mod I S SBA CGA Min Mod Max Total NT Comments   BASIC ADLs:              Upper Body Bathing  [] [] [] [x] [] [] [] [] [] []     Lower Body Bathing [] [] [] [] [] [x] [] [] [] []     Toileting [] [] [] [] [] [x] [] [] [] []    Upper Body Dressing [] [] [] [] [] [x] [] [] [] [] Donned gown    Lower Body Dressing [] [] [] [] [] [x] [] [] [] [] Donned socks    Feeding [] [] [x] [] [] [] [] [] [] []    Grooming [] [] [x] [] [] [] [] [] [] []    Personal Device Care [] [] [] [] [] [] [] [] [] []    Functional Mobility [] [] [] [] [] [x] [] [] [] [] With rolling walker    I=Independent, Mod I=Modified Independent, S=Supervision/Setup, SBA=Standby Assistance, CGA=Contact Guard Assistance, Min=Minimal Assistance, Mod=Moderate Assistance, Max=Maximal

## 2024-05-03 NOTE — DISCHARGE SUMMARY
Hospitalist Discharge Summary   Admit Date:  2024  1:18 PM   DC Note date: 5/3/2024  Name:  Tanya Bailey   Age:  63 y.o.  Sex:  female  :  1960   MRN:  [id number]   Room:  [facility]  PCP:  Yuriy Hollis MD    Presenting Complaint: Dizziness     Initial Admission Diagnosis: Dizziness [R42]  Ataxia [R27.0]  Generalized weakness [R53.1]     Problem List for this Hospitalization (present on admission):    Principal Problem:    Dizziness  Active Problems:    Primary insomnia    Class 2 severe obesity due to excess calories with serious comorbidity and body mass index (BMI) of 36.0 to 36.9 in adult (HCC)    Pulmonary nodules    Thyroid nodule    Schizophrenia (HCC)    Neuropathy    Blindness left eye category 4, normal vision right eye    Bipolar affective disorder (HCC)    Adult residual type attention deficit hyperactivity disorder (ADHD)    Bipolar 1 disorder, manic, mild (HCC)    ADHD (attention deficit hyperactivity disorder)    Cerebrovascular disease    Amphetamine use disorder, severe, in sustained remission (HCC)    Cocaine use disorder, moderate, in sustained remission (HCC)    Unsteadiness on feet    H/O arterial ischemic stroke    Anemia    Urinary frequency    UTI (urinary tract infection)  Resolved Problems:    * No resolved hospital problems. *      Hospital Course:  Tanya Bailey is a 63 y.o. female with medical history of dizziness, chronic left eye blindness, obesity, schizophrenia, bipolar disorder, ADHD, history of amphetamine abuse, and history of ischemic stroke who presented with balance issues.     According to Tanya for the past 2 days she has had difficulty with her balance. Believes last known normal was at 10 PM on . As a result she has had difficulties with ambulation.  She has had prior episodes in the past where she was diagnosed with an ischemic stroke and her symptoms were similar to current.  Additionally overall she has felt less well in the past 2 weeks than baseline.

## 2024-05-03 NOTE — PROGRESS NOTES
SPEECH LANGUAGE PATHOLOGY: COMBINED Dysphagia and Communication Initial Assessment and Discharge  OBSERVATION PATIENT    Acknowledge Order  I  Therapy Time  I   Charges     I  Rehab Caseload Tracker    NAME: Tanya Bailey  : 1960  MRN: [id number]    ADMISSION DATE: 2024  PRIMARY DIAGNOSIS: Dizziness    ICD-10: Treatment Diagnosis: R13.12 Dysphagia, Oropharyngeal Phase  F80.1 Expressive Language Disorder    RECOMMENDATIONS   Diet:  regular  thin liquids    Medications: as tolerated   Compensatory Swallowing Strategies:   Upright for all PO  Small bites and sips  Slow rate of PO intake  Remain upright for 20-30 min after any PO   Additional Communication Recommendations:  Slow rate, overarticulation, increased pausing   Patient continues to require skilled intervention:  No. Please re-consult if new concerns arise.    Anticipated Discharge Needs: Ongoing speech therapy is recommended at next level of care, if symptoms worsen.       ASSESSMENT    Patient presents with oral and pharyngeal phase of swallow within functional limits with no overt signs or symptoms of aspiration observed with any consistency assessed. Swallows of all textures timely, clear to cervical auscultation and with adequate laryngeal excursion. Voice clear after swallow. No oral residue observed.     Recommend continue regular texture diet and thin liquids. Give meds as tolerated.     Patient presents with conversational speech clear and appropriate without notable word-finding difficulty. Patient does report occasional difficulty with word-finding, but \"Not when it's important\". Patient also noted to exhibit errors with diadochokinetic tasks, across all trials, but no corresponding slurred speech noted with words/phrases.    Discussed follow-up with speech therapy at next level of care, but patient reports she is not interested. Educated patient on slow rate, overarticulation and increased pausing and given strategies for  word-finding.    GENERAL    Subjective: Patient curled in bed, but willing to participate. Visitor present.    Recent Information/Background: According to Tanya, for the past 2 days she has had difficulty with her balance. As a result she has had difficulties with ambulation.  Other than the difficulties with balance and what she describes as bilateral blurriness in her vision, which has been present for over 1 week and not worsening, which she has had a history of in the past as well.  She also has a known history of blindness in the left eye. Other than the above symptoms she has felt like her usual self.  She has been eating and drinking normally.    History of Present Injury/Illness: Ms. Bailey  has a past medical history of Adult residual type attention deficit hyperactivity disorder (ADHD), Backache, Bipolar 1 disorder, manic, mild (HCC), Bipolar affective disorder (HCC), Blindness left eye category 4, normal vision right eye, Cerebrovascular disease, Cigarette nicotine dependence without complication, Class 2 severe obesity due to excess calories with serious comorbidity and body mass index (BMI) of 36.0 to 36.9 in adult (HCC), Confusion and disorientation, Debility, Elevated LDL cholesterol level, History of cerebrovascular accident, Mitral regurgitation, Neuropathy, Primary insomnia, Pulmonary nodules, Right lower lobe pneumonia, Schizophrenia (HCC), Stroke-like symptoms, Thyroid nodule, and Tobacco use disorder.. She also  has a past surgical history that includes Appendectomy.  Precautions/Allergies: Hydroxyzine pamoate and Codeine     Observations:  Alertness: Alert  Speech: Intelligible  Expressive Language: Able to communicate wants and needs  Receptive Language: Appropriately responds to questions  Cognition: Appropriately attends to clinician    Prior Dysphagia History: denies  Prior Instrumental Assessment: none  Prior Speech Therapy: none    Current Diet:  Regular Consistency  Thin

## 2024-05-03 NOTE — CARE COORDINATION
RN CM attempted to meet with the patient and visitor. The attending MD was at the bedside providing patient care.

## 2024-05-03 NOTE — CARE COORDINATION
05/03/24 1203   Service Assessment   Patient Orientation Alert and Oriented   Cognition Alert   History Provided By Patient   Primary Caregiver Self   Support Systems Friends/Neighbors   PCP Verified by CM Yes   Prior Functional Level Independent in ADLs/IADLs   Current Functional Level Independent in ADLs/IADLs   Can patient return to prior living arrangement Yes   Ability to make needs known: Good   Family able to assist with home care needs: Yes   Would you like for me to discuss the discharge plan with any other family members/significant others, and if so, who? Yes  (Martha Arrieta)   Financial Resources Medicare   Community Resources None   Social/Functional History   Lives With Friend(s)   Type of Home House   Bathroom Shower/Tub None   Bathroom Equipment None   Home Equipment Walker, rolling   ADL Assistance Independent   Mode of Transportation Car   Discharge Planning   Type of Residence House   Living Arrangements Friends   Potential Assistance Needed Home Care   DME Ordered? No   Potential Assistance Purchasing Medications No   Patient expects to be discharged to: [facility]  (She plans on discharging home with her friend Dayana at [address] [place] [id number])   Services At/After Discharge   Transition of Care Consult (CM Consult) Discharge Planning   Services At/After Discharge Home Health   Confirm Follow Up Transport Friends   Condition of Participation: Discharge Planning   The Patient and/or Patient Representative was provided with a Choice of Provider? Patient   The Patient and/Or Patient Representative agree with the Discharge Plan? Yes   Freedom of Choice list was provided with basic dialogue that supports the patient's individualized plan of care/goals, treatment preferences, and shares the quality data associated with the providers?  Yes     RN CM met with the patient and friend Dayana at the bedside and discussed discharge planning. She lives with her friend Sherry in a private residence and

## 2024-05-05 ENCOUNTER — TELEPHONE (OUTPATIENT)
Dept: INTERNAL MEDICINE | Age: 64
End: 2024-05-05

## 2024-05-05 DIAGNOSIS — N30.00 ACUTE CYSTITIS WITHOUT HEMATURIA: Primary | ICD-10-CM

## 2024-05-05 LAB
BACTERIA SPEC CULT: ABNORMAL
BACTERIA SPEC CULT: ABNORMAL
SERVICE CMNT-IMP: ABNORMAL

## 2024-05-05 RX ORDER — AMOXICILLIN 500 MG/1
500 CAPSULE ORAL 3 TIMES DAILY
Qty: 21 CAPSULE | Refills: 0 | Status: SHIPPED | OUTPATIENT
Start: 2024-05-05 | End: 2024-05-12

## 2024-05-06 ENCOUNTER — OFFICE VISIT (OUTPATIENT)
Dept: INTERNAL MEDICINE CLINIC | Facility: CLINIC | Age: 64
End: 2024-05-06
Payer: MEDICARE

## 2024-05-06 VITALS
HEART RATE: 89 BPM | HEIGHT: 69 IN | WEIGHT: 236 LBS | TEMPERATURE: 98 F | SYSTOLIC BLOOD PRESSURE: 129 MMHG | BODY MASS INDEX: 34.96 KG/M2 | DIASTOLIC BLOOD PRESSURE: 76 MMHG | OXYGEN SATURATION: 98 %

## 2024-05-06 DIAGNOSIS — N39.0 URINARY TRACT INFECTION DUE TO ENTEROCOCCUS: Primary | ICD-10-CM

## 2024-05-06 DIAGNOSIS — F51.01 PRIMARY INSOMNIA: ICD-10-CM

## 2024-05-06 DIAGNOSIS — B95.2 URINARY TRACT INFECTION DUE TO ENTEROCOCCUS: Primary | ICD-10-CM

## 2024-05-06 DIAGNOSIS — F31.11 BIPOLAR 1 DISORDER, MANIC, MILD (HCC): ICD-10-CM

## 2024-05-06 LAB
BACTERIA SPEC CULT: ABNORMAL
BACTERIA SPEC CULT: ABNORMAL
SERVICE CMNT-IMP: ABNORMAL

## 2024-05-06 PROCEDURE — G8427 DOCREV CUR MEDS BY ELIG CLIN: HCPCS | Performed by: INTERNAL MEDICINE

## 2024-05-06 PROCEDURE — G2211 COMPLEX E/M VISIT ADD ON: HCPCS | Performed by: INTERNAL MEDICINE

## 2024-05-06 PROCEDURE — 1036F TOBACCO NON-USER: CPT | Performed by: INTERNAL MEDICINE

## 2024-05-06 PROCEDURE — 99214 OFFICE O/P EST MOD 30 MIN: CPT | Performed by: INTERNAL MEDICINE

## 2024-05-06 PROCEDURE — 3017F COLORECTAL CA SCREEN DOC REV: CPT | Performed by: INTERNAL MEDICINE

## 2024-05-06 PROCEDURE — G8417 CALC BMI ABV UP PARAM F/U: HCPCS | Performed by: INTERNAL MEDICINE

## 2024-05-06 RX ORDER — TRAZODONE HYDROCHLORIDE 50 MG/1
50 TABLET ORAL NIGHTLY
Qty: 90 TABLET | Refills: 1 | Status: SHIPPED | OUTPATIENT
Start: 2024-05-06

## 2024-05-06 ASSESSMENT — ENCOUNTER SYMPTOMS: SHORTNESS OF BREATH: 0

## 2024-05-06 NOTE — PROGRESS NOTES
[facility]  Yuriy Hollis M.D.  Internal Medicine  [address] [place] [id number]  Office : [phone]  Fax : [phone]    Chief Complaint   Patient presents with    Follow-Up from Hospital     Hospital follow up for stroke like symptoms        History of Present Illness:  Tanya Bailey is a 63 y.o. female.  [facility]    Hospital Follow Up  Admitted from 5/2 to 5/3/2024 with dizziness and evaluation eventually was indicative of an enterococcal UTI.  She was prescribed keflex which was changed to amoxicillin.  MRI brain performed due to concerns about a stroke and it was negative.  She feels better today    Mental Illness  Has not seen psychiatry yet and stopped seroquel on her own.  Having trouble sleeping and would like to restart trazodone    Past Medical History:  Past Medical History:   Diagnosis Date    Adult residual type attention deficit hyperactivity disorder (ADHD) 11/13/2020    Backache 12/16/2014    Bipolar 1 disorder, manic, mild ([facility]) 06/15/2021    Bipolar affective disorder ([facility]) 01/30/2014    Blindness left eye category 4, normal vision right eye 03/04/2022    Cerebrovascular disease 07/12/2023    Normal MRI 2024    Cigarette nicotine dependence without complication 03/04/2022    Class 2 severe obesity due to excess calories with serious comorbidity and body mass index (BMI) of 36.0 to 36.9 in adult ([facility]) 04/21/2023    Confusion and disorientation 06/01/2017    Debility 06/13/2021    Elevated LDL cholesterol level 03/04/2022    Mitral regurgitation 04/22/2023    Neuropathy 09/15/2020    Primary insomnia 09/15/2020    Pulmonary nodules 04/21/2023    Needs follow up CT 4/2024    Right lower lobe pneumonia 11/28/2021    Schizophrenia ([facility]) 01/30/2014    Thyroid nodule 04/21/2023    Bilateral thyroid nodules. Recommend biopsy of lesion #2 in the right thyroid lobe and the left thyroid nodule.    Tobacco use disorder 09/15/2020     Past Surgical

## 2024-05-14 ENCOUNTER — TELEPHONE (OUTPATIENT)
Dept: INTERNAL MEDICINE CLINIC | Facility: CLINIC | Age: 64
End: 2024-05-14

## 2024-05-14 DIAGNOSIS — F51.01 PRIMARY INSOMNIA: Primary | ICD-10-CM

## 2024-05-14 RX ORDER — TRAZODONE HYDROCHLORIDE 100 MG/1
100 TABLET ORAL NIGHTLY
Qty: 90 TABLET | Refills: 0 | Status: SHIPPED | OUTPATIENT
Start: 2024-05-14

## 2024-05-14 NOTE — TELEPHONE ENCOUNTER
Spoke to patient, notified that a prescription for the increased dose of Trazodone had been sent to her pharmacy, Dr Hollis increased the dose to 100mg.

## 2024-05-14 NOTE — TELEPHONE ENCOUNTER
Patient called, the Trazodone 50 mg that was prescribed on 5-6-24 is not helping her sleep at all.  Patient states that she used to take 150 mg.  Patient would like to know if the increased dose could be prescribed.  Patient is using the [facility] on [address])  Patient was last seen 3-28-24, next OV 6-4-24

## 2024-05-23 DIAGNOSIS — F31.9 BIPOLAR AFFECTIVE DISORDER, REMISSION STATUS UNSPECIFIED (HCC): ICD-10-CM

## 2024-05-23 DIAGNOSIS — F20.9 SCHIZOPHRENIA, UNSPECIFIED TYPE (HCC): ICD-10-CM

## 2024-05-23 DIAGNOSIS — F31.11 BIPOLAR 1 DISORDER, MANIC, MILD (HCC): ICD-10-CM

## 2024-05-23 RX ORDER — QUETIAPINE FUMARATE 100 MG/1
100 TABLET, FILM COATED ORAL NIGHTLY
Qty: 90 TABLET | Refills: 0 | OUTPATIENT
Start: 2024-05-23

## 2024-05-23 NOTE — TELEPHONE ENCOUNTER
Dose: 150 mg Route: Oral Frequency: EVERY BEDTIME   Dispense Quantity: 90 tablet Refills: 1          Sig: Take 150 mg by mouth nightly         Start Date: 03/28/24 End Date: 05/06/24   Discontinued by: Yuriy Hollis MD on 5/6/2024 08:43   Reason: Patient Choice

## 2024-06-02 PROBLEM — N39.0 UTI (URINARY TRACT INFECTION): Status: RESOLVED | Noted: 2024-05-03 | Resolved: 2024-06-02

## 2024-06-04 ENCOUNTER — OFFICE VISIT (OUTPATIENT)
Dept: INTERNAL MEDICINE CLINIC | Facility: CLINIC | Age: 64
End: 2024-06-04
Payer: MEDICARE

## 2024-06-04 VITALS
SYSTOLIC BLOOD PRESSURE: 125 MMHG | WEIGHT: 242 LBS | DIASTOLIC BLOOD PRESSURE: 88 MMHG | OXYGEN SATURATION: 99 % | HEIGHT: 69 IN | BODY MASS INDEX: 35.84 KG/M2 | TEMPERATURE: 97.9 F | HEART RATE: 68 BPM

## 2024-06-04 DIAGNOSIS — K21.9 GASTROESOPHAGEAL REFLUX DISEASE, UNSPECIFIED WHETHER ESOPHAGITIS PRESENT: ICD-10-CM

## 2024-06-04 DIAGNOSIS — Z87.898 HISTORY OF MOTION SICKNESS: ICD-10-CM

## 2024-06-04 DIAGNOSIS — M79.7 FIBROMYALGIA: ICD-10-CM

## 2024-06-04 DIAGNOSIS — R30.0 DYSURIA: Primary | ICD-10-CM

## 2024-06-04 DIAGNOSIS — F31.11 BIPOLAR 1 DISORDER, MANIC, MILD (HCC): ICD-10-CM

## 2024-06-04 LAB
BILIRUBIN, URINE, POC: NEGATIVE
BLOOD URINE, POC: NEGATIVE
GLUCOSE URINE, POC: NEGATIVE
KETONES, URINE, POC: NEGATIVE
LEUKOCYTE ESTERASE, URINE, POC: ABNORMAL
NITRITE, URINE, POC: NEGATIVE
PH, URINE, POC: 6 (ref 4.6–8)
PROTEIN,URINE, POC: NEGATIVE
SPECIFIC GRAVITY, URINE, POC: 1.01 (ref 1–1.03)
URINALYSIS CLARITY, POC: CLEAR
URINALYSIS COLOR, POC: YELLOW
UROBILINOGEN, POC: ABNORMAL

## 2024-06-04 PROCEDURE — 81003 URINALYSIS AUTO W/O SCOPE: CPT | Performed by: INTERNAL MEDICINE

## 2024-06-04 PROCEDURE — 1036F TOBACCO NON-USER: CPT | Performed by: INTERNAL MEDICINE

## 2024-06-04 PROCEDURE — 99214 OFFICE O/P EST MOD 30 MIN: CPT | Performed by: INTERNAL MEDICINE

## 2024-06-04 PROCEDURE — G8417 CALC BMI ABV UP PARAM F/U: HCPCS | Performed by: INTERNAL MEDICINE

## 2024-06-04 PROCEDURE — G2211 COMPLEX E/M VISIT ADD ON: HCPCS | Performed by: INTERNAL MEDICINE

## 2024-06-04 PROCEDURE — G8427 DOCREV CUR MEDS BY ELIG CLIN: HCPCS | Performed by: INTERNAL MEDICINE

## 2024-06-04 PROCEDURE — 3017F COLORECTAL CA SCREEN DOC REV: CPT | Performed by: INTERNAL MEDICINE

## 2024-06-04 RX ORDER — GABAPENTIN 800 MG/1
800 TABLET ORAL 3 TIMES DAILY
Qty: 270 TABLET | Refills: 1 | Status: SHIPPED | OUTPATIENT
Start: 2024-06-04 | End: 2024-12-01

## 2024-06-04 RX ORDER — PANTOPRAZOLE SODIUM 40 MG/1
40 TABLET, DELAYED RELEASE ORAL
Qty: 90 TABLET | Refills: 1 | Status: SHIPPED | OUTPATIENT
Start: 2024-06-04

## 2024-06-04 RX ORDER — ONDANSETRON 4 MG/1
4 TABLET, ORALLY DISINTEGRATING ORAL 3 TIMES DAILY PRN
Qty: 21 TABLET | Refills: 0 | Status: CANCELLED | OUTPATIENT
Start: 2024-06-04

## 2024-06-04 RX ORDER — NITROFURANTOIN 25; 75 MG/1; MG/1
100 CAPSULE ORAL 2 TIMES DAILY
Qty: 14 CAPSULE | Refills: 0 | Status: SHIPPED | OUTPATIENT
Start: 2024-06-04 | End: 2024-06-11

## 2024-06-04 RX ORDER — SCOLOPAMINE TRANSDERMAL SYSTEM 1 MG/1
1 PATCH, EXTENDED RELEASE TRANSDERMAL
Qty: 4 PATCH | Refills: 5 | Status: SHIPPED | OUTPATIENT
Start: 2024-06-04

## 2024-06-04 RX ORDER — QUETIAPINE 150 MG/1
150 TABLET, FILM COATED, EXTENDED RELEASE ORAL
Qty: 90 TABLET | Refills: 1 | Status: SHIPPED | OUTPATIENT
Start: 2024-06-04

## 2024-06-04 ASSESSMENT — PATIENT HEALTH QUESTIONNAIRE - PHQ9
5. POOR APPETITE OR OVEREATING: SEVERAL DAYS
9. THOUGHTS THAT YOU WOULD BE BETTER OFF DEAD, OR OF HURTING YOURSELF: NOT AT ALL
SUM OF ALL RESPONSES TO PHQ QUESTIONS 1-9: 6
7. TROUBLE CONCENTRATING ON THINGS, SUCH AS READING THE NEWSPAPER OR WATCHING TELEVISION: SEVERAL DAYS
3. TROUBLE FALLING OR STAYING ASLEEP: SEVERAL DAYS
6. FEELING BAD ABOUT YOURSELF - OR THAT YOU ARE A FAILURE OR HAVE LET YOURSELF OR YOUR FAMILY DOWN: SEVERAL DAYS
2. FEELING DOWN, DEPRESSED OR HOPELESS: SEVERAL DAYS
10. IF YOU CHECKED OFF ANY PROBLEMS, HOW DIFFICULT HAVE THESE PROBLEMS MADE IT FOR YOU TO DO YOUR WORK, TAKE CARE OF THINGS AT HOME, OR GET ALONG WITH OTHER PEOPLE: NOT DIFFICULT AT ALL
SUM OF ALL RESPONSES TO PHQ QUESTIONS 1-9: 6
4. FEELING TIRED OR HAVING LITTLE ENERGY: SEVERAL DAYS
SUM OF ALL RESPONSES TO PHQ QUESTIONS 1-9: 6
8. MOVING OR SPEAKING SO SLOWLY THAT OTHER PEOPLE COULD HAVE NOTICED. OR THE OPPOSITE, BEING SO FIGETY OR RESTLESS THAT YOU HAVE BEEN MOVING AROUND A LOT MORE THAN USUAL: NOT AT ALL
SUM OF ALL RESPONSES TO PHQ QUESTIONS 1-9: 6

## 2024-06-04 ASSESSMENT — ANXIETY QUESTIONNAIRES
6. BECOMING EASILY ANNOYED OR IRRITABLE: NOT AT ALL
GAD7 TOTAL SCORE: 1
1. FEELING NERVOUS, ANXIOUS, OR ON EDGE: SEVERAL DAYS
IF YOU CHECKED OFF ANY PROBLEMS ON THIS QUESTIONNAIRE, HOW DIFFICULT HAVE THESE PROBLEMS MADE IT FOR YOU TO DO YOUR WORK, TAKE CARE OF THINGS AT HOME, OR GET ALONG WITH OTHER PEOPLE: NOT DIFFICULT AT ALL
5. BEING SO RESTLESS THAT IT IS HARD TO SIT STILL: NOT AT ALL
7. FEELING AFRAID AS IF SOMETHING AWFUL MIGHT HAPPEN: NOT AT ALL
2. NOT BEING ABLE TO STOP OR CONTROL WORRYING: NOT AT ALL
3. WORRYING TOO MUCH ABOUT DIFFERENT THINGS: NOT AT ALL
4. TROUBLE RELAXING: NOT AT ALL

## 2024-06-04 ASSESSMENT — ENCOUNTER SYMPTOMS: SHORTNESS OF BREATH: 0

## 2024-06-04 NOTE — PROGRESS NOTES
[facility]  Yuriy Hollis M.D.  Internal Medicine  [address] [place] [address]  Office : [phone]  Fax : [phone]    Chief Complaint   Patient presents with    Hyperlipidemia     6 mo follow up    Dysuria    Depression       History of Present Illness:  Tanya Bailey is a 63 y.o. female.  Dysuria   This is a recurrent problem. The current episode started in the past 7 days. The problem occurs intermittently. The quality of the pain is described as burning. She is Not sexually active. There is No history of pyelonephritis. Associated symptoms include frequency. Pertinent negatives include no chills, flank pain, hematuria, sweats or urgency. She has tried increased fluids for the symptoms. The treatment provided no relief.       Hyperlipidemia  Patient is in for follow-up for hyperlipidemia.  Diet and Lifestyle: generally follows a low fat low cholesterol diet. Risk factors for vascular disease consist of hyperlipidemia. Last LDL was   Lab Results   Component Value Date     (H) 05/03/2024   . Last ALT was   Lab Results   Component Value Date/Time    ALT <5 05/02/2024 01:29 PM   .  No muscle aches.    Migraine  Imitrex helps with migraines    Motion Sickness  Has an upcoming trip to [place] and would like transderm - scop    Mental Illness  Has not seen psychiatry yet but wants to restart Seroquel which did help.  Also on trazodone for sleep    Fibromyalgia  Gabapentin helps    GERD  Has been taking OTC nexium.  Would like a Rx    Past Medical History:  Past Medical History:   Diagnosis Date    Adult residual type attention deficit hyperactivity disorder (ADHD) 11/13/2020    Backache 12/16/2014    Bipolar 1 disorder, manic, mild (HCC) 06/15/2021    Bipolar affective disorder (HCC) 01/30/2014    Blindness left eye category 4, normal vision right eye 03/04/2022    Cerebrovascular disease 07/12/2023    Normal MRI 2024    Cigarette nicotine dependence

## 2024-06-05 DIAGNOSIS — R30.0 DYSURIA: ICD-10-CM

## 2024-06-08 LAB
BACTERIA SPEC CULT: ABNORMAL
BACTERIA SPEC CULT: ABNORMAL
SERVICE CMNT-IMP: ABNORMAL

## 2024-07-17 ENCOUNTER — PATIENT MESSAGE (OUTPATIENT)
Dept: INTERNAL MEDICINE CLINIC | Facility: CLINIC | Age: 64
End: 2024-07-17

## 2024-07-17 NOTE — TELEPHONE ENCOUNTER
Elvira Elise MA 7/17/2024 11:17 AM EDT      ----- Message -----  From: Tanya Bailey \"Larkin\"  Sent: 7/17/2024 10:59 AM EDT  To: [facility] Clinical Staff  Subject: Please help     I am not able to take seroquel. It’s giving me nightmares and my son doesn’t want me in it. Can  please call me in trazadone for sleep. I may nd stronger dose than he gv me before. Hopefully it will work travis having sleep problems again. I do hv apt with paychiatrist samuel three week who will be taken over with my mental health issues. If he could possibly call in, I truly appreciate the help. Thanks in advance.

## 2024-07-29 ENCOUNTER — PATIENT MESSAGE (OUTPATIENT)
Dept: INTERNAL MEDICINE CLINIC | Facility: CLINIC | Age: 64
End: 2024-07-29

## 2024-07-29 DIAGNOSIS — M79.7 FIBROMYALGIA: Primary | ICD-10-CM

## 2024-07-29 NOTE — TELEPHONE ENCOUNTER
From: Tanya Bailey  To: Dr. Yuriy Hollis  Sent: 7/29/2024 8:40 AM EDT  Subject: Please help!    Good morning. I discussed all my pain issues with you when I first saw you. You hv treated me by prescribing me Gabapentin. Understanding you do not do pain management, I did research and found a pain Dr that I have hopes can help me with all my pain issues. Yet, my insurance requires a referral from you. His name is Dr Agustin Oneal in [place]. Number is [phone]. I pray he can help with my Fibro, DDD AND Silvio too. Thanking you in advance. Good Day, Chaya

## 2024-08-15 ENCOUNTER — OFFICE VISIT (OUTPATIENT)
Dept: INTERNAL MEDICINE CLINIC | Facility: CLINIC | Age: 64
End: 2024-08-15
Payer: MEDICARE

## 2024-08-15 VITALS
SYSTOLIC BLOOD PRESSURE: 130 MMHG | DIASTOLIC BLOOD PRESSURE: 71 MMHG | TEMPERATURE: 97.4 F | WEIGHT: 242 LBS | OXYGEN SATURATION: 98 % | HEIGHT: 69 IN | HEART RATE: 69 BPM | BODY MASS INDEX: 35.84 KG/M2

## 2024-08-15 DIAGNOSIS — F31.11 BIPOLAR 1 DISORDER, MANIC, MILD (HCC): Primary | ICD-10-CM

## 2024-08-15 DIAGNOSIS — F41.9 ANXIETY: ICD-10-CM

## 2024-08-15 DIAGNOSIS — F51.01 PRIMARY INSOMNIA: ICD-10-CM

## 2024-08-15 DIAGNOSIS — F20.9 SCHIZOPHRENIA, UNSPECIFIED TYPE (HCC): ICD-10-CM

## 2024-08-15 DIAGNOSIS — G89.29 CHRONIC LOW BACK PAIN, UNSPECIFIED BACK PAIN LATERALITY, UNSPECIFIED WHETHER SCIATICA PRESENT: ICD-10-CM

## 2024-08-15 DIAGNOSIS — M54.50 CHRONIC LOW BACK PAIN, UNSPECIFIED BACK PAIN LATERALITY, UNSPECIFIED WHETHER SCIATICA PRESENT: ICD-10-CM

## 2024-08-15 DIAGNOSIS — F31.9 BIPOLAR AFFECTIVE DISORDER, REMISSION STATUS UNSPECIFIED (HCC): ICD-10-CM

## 2024-08-15 PROCEDURE — 1036F TOBACCO NON-USER: CPT | Performed by: INTERNAL MEDICINE

## 2024-08-15 PROCEDURE — G8427 DOCREV CUR MEDS BY ELIG CLIN: HCPCS | Performed by: INTERNAL MEDICINE

## 2024-08-15 PROCEDURE — 99214 OFFICE O/P EST MOD 30 MIN: CPT | Performed by: INTERNAL MEDICINE

## 2024-08-15 PROCEDURE — 3017F COLORECTAL CA SCREEN DOC REV: CPT | Performed by: INTERNAL MEDICINE

## 2024-08-15 PROCEDURE — G8417 CALC BMI ABV UP PARAM F/U: HCPCS | Performed by: INTERNAL MEDICINE

## 2024-08-15 RX ORDER — DULOXETIN HYDROCHLORIDE 30 MG/1
30 CAPSULE, DELAYED RELEASE ORAL 2 TIMES DAILY
COMMUNITY
Start: 2024-08-02

## 2024-08-15 RX ORDER — BUSPIRONE HYDROCHLORIDE 10 MG/1
10 TABLET ORAL 2 TIMES DAILY
Qty: 180 TABLET | Refills: 1 | Status: SHIPPED | OUTPATIENT
Start: 2024-08-15 | End: 2025-02-11

## 2024-08-15 RX ORDER — BACLOFEN 10 MG/1
10 TABLET ORAL 3 TIMES DAILY
Qty: 30 TABLET | Refills: 1 | Status: SHIPPED | OUTPATIENT
Start: 2024-08-15

## 2024-08-15 RX ORDER — TRAZODONE HYDROCHLORIDE 100 MG/1
100 TABLET ORAL NIGHTLY
Qty: 90 TABLET | Refills: 1 | Status: SHIPPED | OUTPATIENT
Start: 2024-08-15

## 2024-08-15 RX ORDER — DOXEPIN HYDROCHLORIDE 10 MG/1
10 CAPSULE ORAL NIGHTLY
COMMUNITY
Start: 2024-08-02 | End: 2024-08-15

## 2024-08-15 ASSESSMENT — ENCOUNTER SYMPTOMS
BACK PAIN: 1
SHORTNESS OF BREATH: 0

## 2024-08-15 NOTE — PROGRESS NOTES
External Referral to Psychiatry    Primary insomnia  -     traZODone (DESYREL) 100 MG tablet; Take 1 tablet by mouth nightly    Schizophrenia, unspecified type (HCC)  -     External Referral to Psychiatry    Bipolar affective disorder, remission status unspecified (HCC)  -     External Referral to Psychiatry    Anxiety  -     busPIRone (BUSPAR) 10 MG tablet; Take 1 tablet by mouth 2 times daily    Chronic low back pain, unspecified back pain laterality, unspecified whether sciatica present  -     baclofen (LIORESAL) 10 MG tablet; Take 1 tablet by mouth 3 times daily        Return if symptoms worsen or fail to improve.  __  Yuriy Hollis M.D.

## 2024-08-21 ENCOUNTER — PATIENT MESSAGE (OUTPATIENT)
Dept: INTERNAL MEDICINE CLINIC | Facility: CLINIC | Age: 64
End: 2024-08-21

## 2024-08-21 DIAGNOSIS — G89.29 CHRONIC LOW BACK PAIN, UNSPECIFIED BACK PAIN LATERALITY, UNSPECIFIED WHETHER SCIATICA PRESENT: Primary | ICD-10-CM

## 2024-08-21 DIAGNOSIS — M54.50 CHRONIC LOW BACK PAIN, UNSPECIFIED BACK PAIN LATERALITY, UNSPECIFIED WHETHER SCIATICA PRESENT: Primary | ICD-10-CM

## 2024-08-21 RX ORDER — TIZANIDINE 2 MG/1
2 TABLET ORAL 3 TIMES DAILY PRN
Qty: 30 TABLET | Refills: 0 | Status: SHIPPED | OUTPATIENT
Start: 2024-08-21

## 2024-08-28 ENCOUNTER — PATIENT MESSAGE (OUTPATIENT)
Dept: INTERNAL MEDICINE CLINIC | Facility: CLINIC | Age: 64
End: 2024-08-28

## 2024-08-28 DIAGNOSIS — G89.29 CHRONIC LOW BACK PAIN, UNSPECIFIED BACK PAIN LATERALITY, UNSPECIFIED WHETHER SCIATICA PRESENT: ICD-10-CM

## 2024-08-28 DIAGNOSIS — M54.50 CHRONIC LOW BACK PAIN, UNSPECIFIED BACK PAIN LATERALITY, UNSPECIFIED WHETHER SCIATICA PRESENT: ICD-10-CM

## 2024-08-28 RX ORDER — TIZANIDINE 2 MG/1
2 TABLET ORAL 3 TIMES DAILY PRN
Qty: 90 TABLET | Refills: 0 | Status: SHIPPED | OUTPATIENT
Start: 2024-08-28

## 2024-11-14 DIAGNOSIS — K21.9 GASTROESOPHAGEAL REFLUX DISEASE, UNSPECIFIED WHETHER ESOPHAGITIS PRESENT: ICD-10-CM

## 2024-11-18 DIAGNOSIS — K21.9 GASTROESOPHAGEAL REFLUX DISEASE, UNSPECIFIED WHETHER ESOPHAGITIS PRESENT: ICD-10-CM

## 2024-11-18 RX ORDER — PANTOPRAZOLE SODIUM 40 MG/1
40 TABLET, DELAYED RELEASE ORAL
Qty: 90 TABLET | Refills: 1 | OUTPATIENT
Start: 2024-11-18

## 2024-11-26 DIAGNOSIS — M54.50 CHRONIC LOW BACK PAIN, UNSPECIFIED BACK PAIN LATERALITY, UNSPECIFIED WHETHER SCIATICA PRESENT: ICD-10-CM

## 2024-11-26 DIAGNOSIS — Z87.898 HISTORY OF MOTION SICKNESS: ICD-10-CM

## 2024-11-26 DIAGNOSIS — G89.29 CHRONIC LOW BACK PAIN, UNSPECIFIED BACK PAIN LATERALITY, UNSPECIFIED WHETHER SCIATICA PRESENT: ICD-10-CM

## 2024-11-26 RX ORDER — TIZANIDINE 2 MG/1
2 TABLET ORAL 3 TIMES DAILY PRN
Qty: 30 TABLET | Refills: 0 | Status: SHIPPED | OUTPATIENT
Start: 2024-11-26

## 2024-11-26 RX ORDER — SCOLOPAMINE TRANSDERMAL SYSTEM 1 MG/1
1 PATCH, EXTENDED RELEASE TRANSDERMAL
Qty: 4 PATCH | Refills: 0 | Status: SHIPPED | OUTPATIENT
Start: 2024-11-26

## 2024-11-26 NOTE — TELEPHONE ENCOUNTER
Pt was last seen in August and needs a refill on the pend medication pt has a upcoming appointment in December the medication was last sent in August for 90 days no refills with the instructions on 3xs daily PRN

## 2024-12-04 ENCOUNTER — OFFICE VISIT (OUTPATIENT)
Dept: INTERNAL MEDICINE CLINIC | Facility: CLINIC | Age: 64
End: 2024-12-04

## 2024-12-04 VITALS
WEIGHT: 236 LBS | HEIGHT: 69 IN | OXYGEN SATURATION: 90 % | TEMPERATURE: 97.2 F | SYSTOLIC BLOOD PRESSURE: 195 MMHG | DIASTOLIC BLOOD PRESSURE: 93 MMHG | HEART RATE: 69 BPM | BODY MASS INDEX: 34.96 KG/M2

## 2024-12-04 DIAGNOSIS — M79.7 FIBROMYALGIA: ICD-10-CM

## 2024-12-04 DIAGNOSIS — Z00.00 MEDICARE ANNUAL WELLNESS VISIT, SUBSEQUENT: ICD-10-CM

## 2024-12-04 DIAGNOSIS — Z87.891 PERSONAL HISTORY OF TOBACCO USE: ICD-10-CM

## 2024-12-04 DIAGNOSIS — F31.11 BIPOLAR 1 DISORDER, MANIC, MILD (HCC): ICD-10-CM

## 2024-12-04 DIAGNOSIS — E78.49 OTHER HYPERLIPIDEMIA: Primary | ICD-10-CM

## 2024-12-04 DIAGNOSIS — Z87.898 HISTORY OF MOTION SICKNESS: ICD-10-CM

## 2024-12-04 DIAGNOSIS — F41.9 ANXIETY: ICD-10-CM

## 2024-12-04 DIAGNOSIS — G89.29 CHRONIC LOW BACK PAIN, UNSPECIFIED BACK PAIN LATERALITY, UNSPECIFIED WHETHER SCIATICA PRESENT: ICD-10-CM

## 2024-12-04 DIAGNOSIS — F51.01 PRIMARY INSOMNIA: ICD-10-CM

## 2024-12-04 DIAGNOSIS — R91.8 PULMONARY NODULES: ICD-10-CM

## 2024-12-04 DIAGNOSIS — M54.50 CHRONIC LOW BACK PAIN, UNSPECIFIED BACK PAIN LATERALITY, UNSPECIFIED WHETHER SCIATICA PRESENT: ICD-10-CM

## 2024-12-04 DIAGNOSIS — K21.9 GASTROESOPHAGEAL REFLUX DISEASE, UNSPECIFIED WHETHER ESOPHAGITIS PRESENT: ICD-10-CM

## 2024-12-04 RX ORDER — TRAZODONE HYDROCHLORIDE 100 MG/1
100 TABLET ORAL NIGHTLY
Qty: 90 TABLET | Refills: 1 | Status: SHIPPED | OUTPATIENT
Start: 2024-12-04

## 2024-12-04 RX ORDER — PANTOPRAZOLE SODIUM 40 MG/1
40 TABLET, DELAYED RELEASE ORAL
Qty: 90 TABLET | Refills: 1 | Status: SHIPPED | OUTPATIENT
Start: 2024-12-04

## 2024-12-04 RX ORDER — BUSPIRONE HYDROCHLORIDE 10 MG/1
10 TABLET ORAL 2 TIMES DAILY
Qty: 180 TABLET | Refills: 1 | Status: SHIPPED | OUTPATIENT
Start: 2024-12-04 | End: 2025-06-02

## 2024-12-04 RX ORDER — TRAZODONE HYDROCHLORIDE 100 MG/1
100 TABLET ORAL NIGHTLY
Qty: 90 TABLET | Refills: 1 | Status: CANCELLED | OUTPATIENT
Start: 2024-12-04

## 2024-12-04 RX ORDER — QUETIAPINE 150 MG/1
150 TABLET, FILM COATED, EXTENDED RELEASE ORAL
Qty: 90 TABLET | Refills: 1 | Status: SHIPPED | OUTPATIENT
Start: 2024-12-04

## 2024-12-04 RX ORDER — PANTOPRAZOLE SODIUM 40 MG/1
40 TABLET, DELAYED RELEASE ORAL
Qty: 90 TABLET | Refills: 1 | Status: SHIPPED | OUTPATIENT
Start: 2024-12-04 | End: 2024-12-04 | Stop reason: SDUPTHER

## 2024-12-04 RX ORDER — GABAPENTIN 800 MG/1
800 TABLET ORAL 3 TIMES DAILY
Qty: 270 TABLET | Refills: 1 | Status: CANCELLED | OUTPATIENT
Start: 2024-12-04 | End: 2025-06-02

## 2024-12-04 RX ORDER — TIZANIDINE 2 MG/1
2 TABLET ORAL 3 TIMES DAILY PRN
Qty: 30 TABLET | Refills: 0 | Status: CANCELLED | OUTPATIENT
Start: 2024-12-04

## 2024-12-04 RX ORDER — ONDANSETRON 4 MG/1
4 TABLET, ORALLY DISINTEGRATING ORAL 3 TIMES DAILY PRN
Qty: 21 TABLET | Refills: 0 | Status: SHIPPED | OUTPATIENT
Start: 2024-12-04

## 2024-12-04 SDOH — ECONOMIC STABILITY: FOOD INSECURITY: WITHIN THE PAST 12 MONTHS, YOU WORRIED THAT YOUR FOOD WOULD RUN OUT BEFORE YOU GOT MONEY TO BUY MORE.: NEVER TRUE

## 2024-12-04 SDOH — ECONOMIC STABILITY: INCOME INSECURITY: HOW HARD IS IT FOR YOU TO PAY FOR THE VERY BASICS LIKE FOOD, HOUSING, MEDICAL CARE, AND HEATING?: NOT HARD AT ALL

## 2024-12-04 SDOH — ECONOMIC STABILITY: FOOD INSECURITY: WITHIN THE PAST 12 MONTHS, THE FOOD YOU BOUGHT JUST DIDN'T LAST AND YOU DIDN'T HAVE MONEY TO GET MORE.: NEVER TRUE

## 2024-12-04 ASSESSMENT — PATIENT HEALTH QUESTIONNAIRE - PHQ9
10. IF YOU CHECKED OFF ANY PROBLEMS, HOW DIFFICULT HAVE THESE PROBLEMS MADE IT FOR YOU TO DO YOUR WORK, TAKE CARE OF THINGS AT HOME, OR GET ALONG WITH OTHER PEOPLE: NOT DIFFICULT AT ALL
SUM OF ALL RESPONSES TO PHQ QUESTIONS 1-9: 0
6. FEELING BAD ABOUT YOURSELF - OR THAT YOU ARE A FAILURE OR HAVE LET YOURSELF OR YOUR FAMILY DOWN: NOT AT ALL
SUM OF ALL RESPONSES TO PHQ QUESTIONS 1-9: 0
1. LITTLE INTEREST OR PLEASURE IN DOING THINGS: NOT AT ALL
SUM OF ALL RESPONSES TO PHQ9 QUESTIONS 1 & 2: 0
7. TROUBLE CONCENTRATING ON THINGS, SUCH AS READING THE NEWSPAPER OR WATCHING TELEVISION: NOT AT ALL
9. THOUGHTS THAT YOU WOULD BE BETTER OFF DEAD, OR OF HURTING YOURSELF: NOT AT ALL
2. FEELING DOWN, DEPRESSED OR HOPELESS: NOT AT ALL
SUM OF ALL RESPONSES TO PHQ QUESTIONS 1-9: 0
8. MOVING OR SPEAKING SO SLOWLY THAT OTHER PEOPLE COULD HAVE NOTICED. OR THE OPPOSITE, BEING SO FIGETY OR RESTLESS THAT YOU HAVE BEEN MOVING AROUND A LOT MORE THAN USUAL: NOT AT ALL
3. TROUBLE FALLING OR STAYING ASLEEP: NOT AT ALL
5. POOR APPETITE OR OVEREATING: NOT AT ALL
SUM OF ALL RESPONSES TO PHQ QUESTIONS 1-9: 0
4. FEELING TIRED OR HAVING LITTLE ENERGY: NOT AT ALL

## 2024-12-04 ASSESSMENT — ANXIETY QUESTIONNAIRES
GAD7 TOTAL SCORE: 2
6. BECOMING EASILY ANNOYED OR IRRITABLE: NOT AT ALL
7. FEELING AFRAID AS IF SOMETHING AWFUL MIGHT HAPPEN: NOT AT ALL
3. WORRYING TOO MUCH ABOUT DIFFERENT THINGS: NOT AT ALL
2. NOT BEING ABLE TO STOP OR CONTROL WORRYING: SEVERAL DAYS
IF YOU CHECKED OFF ANY PROBLEMS ON THIS QUESTIONNAIRE, HOW DIFFICULT HAVE THESE PROBLEMS MADE IT FOR YOU TO DO YOUR WORK, TAKE CARE OF THINGS AT HOME, OR GET ALONG WITH OTHER PEOPLE: NOT DIFFICULT AT ALL
1. FEELING NERVOUS, ANXIOUS, OR ON EDGE: SEVERAL DAYS
4. TROUBLE RELAXING: NOT AT ALL
5. BEING SO RESTLESS THAT IT IS HARD TO SIT STILL: NOT AT ALL

## 2024-12-04 ASSESSMENT — LIFESTYLE VARIABLES
HOW MANY STANDARD DRINKS CONTAINING ALCOHOL DO YOU HAVE ON A TYPICAL DAY: PATIENT DOES NOT DRINK
HOW OFTEN DO YOU HAVE A DRINK CONTAINING ALCOHOL: NEVER

## 2024-12-04 ASSESSMENT — ENCOUNTER SYMPTOMS: SHORTNESS OF BREATH: 0

## 2024-12-04 NOTE — PROGRESS NOTES
[facility]  Yuriy Hollis M.D.  Internal Medicine  [address] [place] [id number]  Office : [phone]  Fax : [phone]    Chief Complaint   Patient presents with    Hyperlipidemia    Medicare AWV     Sub aw       History of Present Illness:  Tanya Bailey is a 64 y.o. female.  HPI    Hyperlipidemia  Patient is in for follow-up for hyperlipidemia.  Diet and Lifestyle: generally follows a low fat low cholesterol diet. Risk factors for vascular disease consist of hyperlipidemia. Last LDL was   Lab Results   Component Value Date     (H) 05/03/2024   . Last ALT was   Lab Results   Component Value Date/Time    ALT <5 05/02/2024 01:29 PM   .  No muscle aches.  No taking lipitor    Migraine  Imitrex helps with migraines    Motion Sickness  Did not go on cruise to [place]    Mental [facility]  Saw psychiatrist    Fibromyalgia  Gabapentin helps being prescribed by pain management    GERD  Stable on pantoprazole    Past Medical History:  Past Medical History:   Diagnosis Date    Adult residual type attention deficit hyperactivity disorder (ADHD) 11/13/2020    Backache 12/16/2014    Bipolar 1 disorder, manic, mild (HCC) 06/15/2021    Bipolar affective disorder (HCC) 01/30/2014    Blindness left eye category 4, normal vision right eye 03/04/2022    Cerebrovascular disease 07/12/2023    Normal MRI 2024    Cigarette nicotine dependence without complication 03/04/2022    Class 2 severe obesity due to excess calories with serious comorbidity and body mass index (BMI) of 36.0 to 36.9 in adult 04/21/2023    Confusion and disorientation 06/01/2017    Debility 06/13/2021    Elevated LDL cholesterol level 03/04/2022    Mitral regurgitation 04/22/2023    Neuropathy 09/15/2020    Primary insomnia 09/15/2020    Pulmonary nodules 04/21/2023    Needs follow up CT 4/2024    Right lower lobe pneumonia 11/28/2021    Schizophrenia (HCC) 01/30/2014    Thyroid nodule 04/21/2023

## 2024-12-04 NOTE — PROGRESS NOTES
[facility]  Yuriy Hollis M.D.  Internal Medicine  [address] [place] [address]  Office : [phone]  Fax : [phone]    CHIEF COMPLAINT  Chief Complaint   Patient presents with    Hyperlipidemia    Medicare AWV     Sub awv         Medicare Annual Wellness Visit    Tanya Bailey is here for Hyperlipidemia and Medicare AWV (Sub awv)    Assessment & Plan   Medicare annual wellness visit, subsequent  Anxiety  The following orders have not been finalized:  -     busPIRone (BUSPAR) 10 MG tablet  Fibromyalgia  The following orders have not been finalized:  -     gabapentin (NEURONTIN) 800 MG tablet  Gastroesophageal reflux disease, unspecified whether esophagitis present  The following orders have not been finalized:  -     pantoprazole (PROTONIX) 40 MG tablet  Bipolar 1 disorder, manic, mild (HCC)  The following orders have not been finalized:  -     QUEtiapine (SEROQUEL XR) 150 MG TB24 extended release tablet  History of motion sickness  Chronic low back pain, unspecified back pain laterality, unspecified whether sciatica present  The following orders have not been finalized:  -     tiZANidine (ZANAFLEX) 2 MG tablet  Primary insomnia  The following orders have not been finalized:  -     traZODone (DESYREL) 100 MG tablet  Personal history of tobacco use  -     WA VISIT TO DISCUSS LUNG CA SCREEN W LDCT    Recommendations for Preventive Services Due: see orders and patient instructions/AVS.  Recommended screening schedule for the next 5-10 years is provided to the patient in written form: see Patient Instructions/AVS.     No follow-ups on file.     Subjective       Patient's complete Health Risk Assessment and screening values have been reviewed and are found in Flowsheets. The following problems were reviewed today and where indicated follow up appointments were made and/or referrals ordered.    Positive Risk Factor Screenings with Interventions:

## 2024-12-18 ENCOUNTER — PATIENT MESSAGE (OUTPATIENT)
Dept: INTERNAL MEDICINE CLINIC | Facility: CLINIC | Age: 64
End: 2024-12-18

## 2024-12-18 RX ORDER — DULOXETIN HYDROCHLORIDE 30 MG/1
30 CAPSULE, DELAYED RELEASE ORAL 2 TIMES DAILY
Qty: 60 CAPSULE | Refills: 0 | Status: SHIPPED | OUTPATIENT
Start: 2024-12-18

## 2024-12-19 ENCOUNTER — TELEPHONE (OUTPATIENT)
Dept: INTERNAL MEDICINE CLINIC | Facility: CLINIC | Age: 64
End: 2024-12-19

## 2024-12-19 NOTE — TELEPHONE ENCOUNTER
----- Message from Miriam MICHAELS sent at 12/18/2024  2:02 PM EST -----  Regarding: FW: ECC Appointment Request  Not sure if she still needs this appt or if anyone called her yesterday- She was just seen on 12/4.  ----- Message -----  From: Karin Shipley  Sent: 12/17/2024  11:18 AM EST  To: [facility] Clinical Staff  Subject: ECC Appointment Request                          ECC Appointment Request    Patient needs appointment for ECC Appointment Type: Existing Condition Follow Up/ medication prescription    Patient Requested Dates(s):  As soon as possible  Patient Requested Time: Anytime  Provider Name: Yuriy Hollis MD      Reason for Appointment Request: Established Patient - Available appointments did not meet patient need  --------------------------------------------------------------------------------------------------------------------------    Relationship to Patient: Self     Call Back Information: OK to leave message on voicemail  Preferred Call Back Number: Phone [phone]